# Patient Record
Sex: FEMALE | Race: BLACK OR AFRICAN AMERICAN | NOT HISPANIC OR LATINO | URBAN - METROPOLITAN AREA
[De-identification: names, ages, dates, MRNs, and addresses within clinical notes are randomized per-mention and may not be internally consistent; named-entity substitution may affect disease eponyms.]

---

## 2023-11-08 ENCOUNTER — DOCUMENTATION (OUTPATIENT)
Dept: TRANSPLANT | Facility: HOSPITAL | Age: 58
End: 2023-11-08
Payer: COMMERCIAL

## 2023-11-08 ENCOUNTER — TELEPHONE (OUTPATIENT)
Dept: TRANSPLANT | Facility: HOSPITAL | Age: 58
End: 2023-11-08

## 2023-11-08 VITALS — HEIGHT: 67 IN | WEIGHT: 290.13 LBS | BODY MASS INDEX: 45.54 KG/M2

## 2023-12-22 DIAGNOSIS — Z01.818 PRE-TRANSPLANT EVALUATION FOR KIDNEY TRANSPLANT: Primary | ICD-10-CM

## 2024-01-04 ENCOUNTER — TELEPHONE (OUTPATIENT)
Dept: TRANSPLANT | Facility: HOSPITAL | Age: 59
End: 2024-01-04
Payer: COMMERCIAL

## 2024-01-04 ENCOUNTER — DOCUMENTATION (OUTPATIENT)
Dept: TRANSPLANT | Facility: HOSPITAL | Age: 59
End: 2024-01-04
Payer: COMMERCIAL

## 2024-01-04 DIAGNOSIS — N18.6 ESRD (END STAGE RENAL DISEASE) (MULTI): Primary | ICD-10-CM

## 2024-01-04 NOTE — PROGRESS NOTES
Do you have difficulty reading or writing in English?   no   What is the primary cause of your kidney disease?  Diabetes  Are you currently on dialysis?   yes  If yes, what days do you have your dialysis treatments?  M,W,F  Have you received a transplant before?   no  If yes, what organ, and when and where was your transplant?     Have you been diagnosed with diabetes?    yes  Have you tested positive for hepatitis or HIV?   no  Have you ever been diagnosed with cancer?   no  If yes, what type of cancer, and when and where were you treated?     Do you have a history of a heart attack or stroke?  No  Are you currently or have you previously been seen by a mental health professional?   no  If yes, what is the name of your mental health provider?     Are you a current or former tobacco user?   no  Do you have history of alcohol abuse or dependence?   no  Do you have a history of illegal drug abuse or dependence?   no  Has anyone told you they're willing to donate their kidney to you?   no  Comments:  Intake complete, scheduled virtual [facility].

## 2024-01-18 ENCOUNTER — DOCUMENTATION (OUTPATIENT)
Dept: TRANSPLANT | Facility: HOSPITAL | Age: 59
End: 2024-01-18
Payer: MEDICARE

## 2024-01-18 NOTE — PROGRESS NOTES
Per EV [facility] Dual Plan & Medicaid active.  Opened case with [employer]  & faxed clinical to [phone] pending case# [id number].

## 2024-01-22 ENCOUNTER — TELEPHONE (OUTPATIENT)
Dept: TRANSPLANT | Facility: HOSPITAL | Age: 59
End: 2024-01-22
Payer: MEDICARE

## 2024-01-23 ENCOUNTER — APPOINTMENT (OUTPATIENT)
Dept: TRANSPLANT | Facility: HOSPITAL | Age: 59
End: 2024-01-23
Payer: COMMERCIAL

## 2024-02-12 NOTE — PROGRESS NOTES
Eval Clinic Note:  Patient attended evaluation appts on 02/13/2024 with Dr. Max and Dr. Mendieta.  Medications and allergies reviewed with patient.  Patient presented to appointment with female support.  Patient ambulated with a cane and used a wheelchair.  History:  Patient has a history of HALINA, toe amputations, and is being evaluated to undergo bariatric surgery  Dialysis: Dialysis MWF  Testing completed recently:  CXR, echo, CT A/P, mammogram, cardiology consult - all at [facility]  Testing needed for evaluation: CT cardiac score, dietician call, financial counselor call  Listing Consent: KDPI >85%, DCD, Hep C, Hep B  Comments:  Provided patient with my contact info for questions and concerns.      Per Dr. Mendieta pt will need a 6-MWT also.    LISTING EDUCATION    Patient educated regarding the following prior to placement on the transplant waiting list:   The patient's medical condition, prognosis, and treatment plan.   The expectations and patient responsibilities while on the waiting list, including:   Keeping the transplant center informed of any changes in contact information or insurance coverage   Notifying the transplant center of any changes in medical status   Required testing and/or re-evaluation appointments while awaiting transplant   An overview of the surgical procedure, including potential risks and alternatives.   Information regarding what to expect during the inpatient admission and recovery period.   A discussion regarding organ offers and types of potential donors, including potential risks that may be associated with specific types of donors that could affect the success of the transplant or the health of the patient.  The right to refuse transplantation.     Patient was given the opportunity to have questions answered. Patient was provided a copy of the informed consent for transplant listing.    Education provided by:  Transplant Coordinator: Ilda   Transplant Physician: Mane Mendieta,  MD    Signed listing informed consent received? YES  Patient agrees to be listed for the following:  KDPI > 85% [X]  Donors After Circulatory Death (DCD) [X]  Donors with a Positive Core Antibody for Hepatitis B []  Donors with Hepatitis C Virus to recipients with hepatitis C []  Donors with Hepatitis C Virus to Negative hepatitis C recipients [X]    Patient will be discussed at an upcoming selection committee to determine eligibility to be placed on the [facility] waiting list.

## 2024-02-12 NOTE — PROGRESS NOTES
Patient attended in-person consent signing on 02/13/2024.  Virtual education was completed prior to in-person consent signing.  Accompanied to class with female support.  Patient remained attentive throughout the review session, asking appropriate questions.  Evaluation consents were signed.     PRE-TRANSPLANT EDUCATION  Patient received education regarding the following topics as part of their pre-transplant evaluation:  The evaluation process, including:   Transplant team members and roles    Required consultations and testing   Selection criteria and suitability for transplant   Listing process and receiving an organ offer   Psychosocial and financial considerations for a successful transplant   Patient responsibilities, including the necessity of adhering to a strict medical regimen  An overview of the surgical procedure   Potential medical, surgical, and psychosocial risks to transplantation, including:   Wound infection   Pneumonia   Blood clot formation   Organ rejection, failure, and possibility of re-transplantation   Lifetime immunosuppression therapy and associated risks   Arrhythmias and cardiovascular collapse   Multi-organ system failure   Death   Depression   Post-Traumatic Stress Disorder   Generalized anxiety, issues of dependence, and feelings of guilt  Available alternatives to transplantation  Donor risk factors that could affect the success of the transplant and the health of the patient, including:   Donor age   Donor medical and social history   Condition of the organ   Risk of cash cancer, HIV, Hepatitis B, Hepatitis C, or malaria if the infection is not detectable at the time of donation  Patient's right to withdraw consent for transplantation at any time during the process  Transplants not performed in a Medicare-approved transplant center could affect the patient?s ability to have immunosuppression medication paid for under Medicare part B.   Multiple listing options.    Patient was  given the opportunity to have questions answered. Patient was provided a copy of the informed consent for transplant evaluation.    Signed evaluation informed consent received? 02/13/2024

## 2024-02-13 ENCOUNTER — OFFICE VISIT (OUTPATIENT)
Dept: TRANSPLANT | Facility: HOSPITAL | Age: 59
End: 2024-02-13
Payer: MEDICARE

## 2024-02-13 ENCOUNTER — LAB REQUISITION (OUTPATIENT)
Dept: LAB | Facility: CLINIC | Age: 59
End: 2024-02-13
Payer: MEDICARE

## 2024-02-13 ENCOUNTER — LAB (OUTPATIENT)
Dept: LAB | Facility: LAB | Age: 59
End: 2024-02-13
Payer: COMMERCIAL

## 2024-02-13 ENCOUNTER — EDUCATION (OUTPATIENT)
Dept: TRANSPLANT | Facility: HOSPITAL | Age: 59
End: 2024-02-13
Payer: MEDICARE

## 2024-02-13 ENCOUNTER — SOCIAL WORK (OUTPATIENT)
Dept: TRANSPLANT | Facility: HOSPITAL | Age: 59
End: 2024-02-13
Payer: MEDICARE

## 2024-02-13 VITALS
DIASTOLIC BLOOD PRESSURE: 78 MMHG | HEART RATE: 102 BPM | BODY MASS INDEX: 45.67 KG/M2 | WEIGHT: 291 LBS | SYSTOLIC BLOOD PRESSURE: 151 MMHG | TEMPERATURE: 97.3 F | OXYGEN SATURATION: 96 %

## 2024-02-13 DIAGNOSIS — Z01.818 PRE-TRANSPLANT EVALUATION FOR KIDNEY TRANSPLANT: Primary | ICD-10-CM

## 2024-02-13 DIAGNOSIS — E11.8 TYPE 2 DIABETES MELLITUS WITH UNSPECIFIED COMPLICATIONS (MULTI): ICD-10-CM

## 2024-02-13 DIAGNOSIS — N18.9 CHRONIC KIDNEY DISEASE, UNSPECIFIED CKD STAGE: ICD-10-CM

## 2024-02-13 DIAGNOSIS — Z51.81 ENCOUNTER FOR THERAPEUTIC DRUG LEVEL MONITORING: ICD-10-CM

## 2024-02-13 DIAGNOSIS — Z01.818 PRE-TRANSPLANT EVALUATION FOR KIDNEY TRANSPLANT: ICD-10-CM

## 2024-02-13 DIAGNOSIS — E11.22 TYPE 2 DIABETES MELLITUS WITH DIABETIC CHRONIC KIDNEY DISEASE, UNSPECIFIED CKD STAGE, UNSPECIFIED WHETHER LONG TERM INSULIN USE (MULTI): ICD-10-CM

## 2024-02-13 DIAGNOSIS — N18.6 END STAGE RENAL DISEASE (MULTI): ICD-10-CM

## 2024-02-13 LAB
ABO GROUP (TYPE) IN BLOOD: NORMAL
ALBUMIN SERPL BCP-MCNC: 4 G/DL (ref 3.4–5)
ALP SERPL-CCNC: 168 U/L (ref 33–110)
ALT SERPL W P-5'-P-CCNC: 4 U/L (ref 7–45)
AMYLASE SERPL-CCNC: 40 U/L (ref 29–103)
AST SERPL W P-5'-P-CCNC: 37 U/L (ref 9–39)
BILIRUB DIRECT SERPL-MCNC: 0.1 MG/DL (ref 0–0.3)
BILIRUB SERPL-MCNC: 0.4 MG/DL (ref 0–1.2)
BUN SERPL-MCNC: 31 MG/DL (ref 6–23)
C PEPTIDE SERPL-MCNC: 7.5 NG/ML (ref 0.7–3.9)
CHOLEST SERPL-MCNC: 165 MG/DL (ref 0–199)
CHOLESTEROL/HDL RATIO: 3.6
CMV IGG AVIDITY SERPL IA-RTO: REACTIVE %
CREAT SERPL-MCNC: 6.55 MG/DL (ref 0.5–1.05)
EBV EA IGG SER QL: NEGATIVE
EBV NA AB SER QL: POSITIVE
EBV VCA IGG SER IA-ACNC: POSITIVE
EBV VCA IGM SER IA-ACNC: ABNORMAL
EGFRCR SERPLBLD CKD-EPI 2021: 7 ML/MIN/1.73M*2
ERYTHROCYTE [DISTWIDTH] IN BLOOD BY AUTOMATED COUNT: 14.6 % (ref 11.5–14.5)
EST. AVERAGE GLUCOSE BLD GHB EST-MCNC: 174 MG/DL
FLOW AUTOCROSSMATCH: NORMAL
HBA1C MFR BLD: 7.7 %
HBV CORE AB SER QL: NONREACTIVE
HBV SURFACE AB SER-ACNC: 133.4 MIU/ML
HBV SURFACE AG SERPL QL IA: NONREACTIVE
HCT VFR BLD AUTO: 34.2 % (ref 36–46)
HCV AB SER QL: NONREACTIVE
HDLC SERPL-MCNC: 46.4 MG/DL
HGB BLD-MCNC: 11.2 G/DL (ref 12–16)
HIV 1+2 AB+HIV1 P24 AG SERPL QL IA: NONREACTIVE
HLA CLASS I AB SCREEN,FC: NORMAL
HLA CLASS II AB SCREEN,FC: NORMAL
HLA CLS I TYP PNL BLD/T DONR HIGH RES: NORMAL
HLA RESULTS: NORMAL
HLA-DP2 QL: NORMAL
HLA-DQB1 HIGH RES: NORMAL
HLA-DRB1 HIGH RES: NORMAL
INR PPP: 1 (ref 0.9–1.1)
MCH RBC QN AUTO: 30.6 PG (ref 26–34)
MCHC RBC AUTO-ENTMCNC: 32.7 G/DL (ref 32–36)
MCV RBC AUTO: 93 FL (ref 80–100)
NON-HDL CHOLESTEROL: 119 MG/DL (ref 0–149)
NRBC BLD-RTO: 0 /100 WBCS (ref 0–0)
PHOSPHATE SERPL-MCNC: 3.2 MG/DL (ref 2.5–4.9)
PLATELET # BLD AUTO: 277 X10*3/UL (ref 150–450)
PROT SERPL-MCNC: 7.7 G/DL (ref 6.4–8.2)
PROTHROMBIN TIME: 11 SECONDS (ref 9.8–12.8)
RBC # BLD AUTO: 3.66 X10*6/UL (ref 4–5.2)
RH FACTOR (ANTIGEN D): NORMAL
TREPONEMA PALLIDUM IGG+IGM AB [PRESENCE] IN SERUM OR PLASMA BY IMMUNOASSAY: NONREACTIVE
VARICELLA ZOSTER IGG INDEX: 6.9 IA
VZV IGG SER QL IA: POSITIVE
WBC # BLD AUTO: 9.7 X10*3/UL (ref 4.4–11.3)

## 2024-02-13 PROCEDURE — 99215 OFFICE O/P EST HI 40 MIN: CPT

## 2024-02-13 PROCEDURE — 80323 ALKALOIDS NOS: CPT

## 2024-02-13 PROCEDURE — 99204 OFFICE O/P NEW MOD 45 MIN: CPT | Performed by: STUDENT IN AN ORGANIZED HEALTH CARE EDUCATION/TRAINING PROGRAM

## 2024-02-13 PROCEDURE — 80076 HEPATIC FUNCTION PANEL: CPT

## 2024-02-13 PROCEDURE — 85027 COMPLETE CBC AUTOMATED: CPT

## 2024-02-13 PROCEDURE — 84100 ASSAY OF PHOSPHORUS: CPT

## 2024-02-13 PROCEDURE — 86664 EPSTEIN-BARR NUCLEAR ANTIGEN: CPT

## 2024-02-13 PROCEDURE — 86901 BLOOD TYPING SEROLOGIC RH(D): CPT

## 2024-02-13 PROCEDURE — 83036 HEMOGLOBIN GLYCOSYLATED A1C: CPT

## 2024-02-13 PROCEDURE — 87389 HIV-1 AG W/HIV-1&-2 AB AG IA: CPT

## 2024-02-13 PROCEDURE — 99214 OFFICE O/P EST MOD 30 MIN: CPT | Performed by: STUDENT IN AN ORGANIZED HEALTH CARE EDUCATION/TRAINING PROGRAM

## 2024-02-13 PROCEDURE — 86665 EPSTEIN-BARR CAPSID VCA: CPT

## 2024-02-13 PROCEDURE — 81382 HLA II TYPING 1 LOC HR: CPT | Mod: 59,OUT | Performed by: SURGERY

## 2024-02-13 PROCEDURE — 82150 ASSAY OF AMYLASE: CPT

## 2024-02-13 PROCEDURE — 83718 ASSAY OF LIPOPROTEIN: CPT

## 2024-02-13 PROCEDURE — 80307 DRUG TEST PRSMV CHEM ANLYZR: CPT

## 2024-02-13 PROCEDURE — 99205 OFFICE O/P NEW HI 60 MIN: CPT

## 2024-02-13 PROCEDURE — 86644 CMV ANTIBODY: CPT

## 2024-02-13 PROCEDURE — 86780 TREPONEMA PALLIDUM: CPT

## 2024-02-13 PROCEDURE — 85610 PROTHROMBIN TIME: CPT

## 2024-02-13 PROCEDURE — 86704 HEP B CORE ANTIBODY TOTAL: CPT

## 2024-02-13 PROCEDURE — 86663 EPSTEIN-BARR ANTIBODY: CPT

## 2024-02-13 PROCEDURE — 84681 ASSAY OF C-PEPTIDE: CPT

## 2024-02-13 PROCEDURE — 86481 TB AG RESPONSE T-CELL SUSP: CPT

## 2024-02-13 PROCEDURE — 86803 HEPATITIS C AB TEST: CPT

## 2024-02-13 PROCEDURE — 36415 COLL VENOUS BLD VENIPUNCTURE: CPT

## 2024-02-13 PROCEDURE — 84520 ASSAY OF UREA NITROGEN: CPT

## 2024-02-13 PROCEDURE — 86706 HEP B SURFACE ANTIBODY: CPT

## 2024-02-13 PROCEDURE — 82465 ASSAY BLD/SERUM CHOLESTEROL: CPT

## 2024-02-13 PROCEDURE — 86825 HLA X-MATH NON-CYTOTOXIC: CPT | Mod: OUT | Performed by: SURGERY

## 2024-02-13 PROCEDURE — 87340 HEPATITIS B SURFACE AG IA: CPT

## 2024-02-13 PROCEDURE — 86900 BLOOD TYPING SEROLOGIC ABO: CPT

## 2024-02-13 PROCEDURE — 82565 ASSAY OF CREATININE: CPT

## 2024-02-13 PROCEDURE — 86787 VARICELLA-ZOSTER ANTIBODY: CPT

## 2024-02-13 ASSESSMENT — PATIENT HEALTH QUESTIONNAIRE - PHQ9
7. TROUBLE CONCENTRATING ON THINGS, SUCH AS READING THE NEWSPAPER OR WATCHING TELEVISION: NOT AT ALL
2. FEELING DOWN, DEPRESSED OR HOPELESS: NOT AT ALL
SUM OF ALL RESPONSES TO PHQ9 QUESTIONS 1 & 2: 2
4. FEELING TIRED OR HAVING LITTLE ENERGY: NEARLY EVERY DAY
5. POOR APPETITE OR OVEREATING: NOT AT ALL
3. TROUBLE FALLING OR STAYING ASLEEP OR SLEEPING TOO MUCH: NOT AT ALL
6. FEELING BAD ABOUT YOURSELF - OR THAT YOU ARE A FAILURE OR HAVE LET YOURSELF OR YOUR FAMILY DOWN: NOT AT ALL
10. IF YOU CHECKED OFF ANY PROBLEMS, HOW DIFFICULT HAVE THESE PROBLEMS MADE IT FOR YOU TO DO YOUR WORK, TAKE CARE OF THINGS AT HOME, OR GET ALONG WITH OTHER PEOPLE: NOT DIFFICULT AT ALL
1. LITTLE INTEREST OR PLEASURE IN DOING THINGS: MORE THAN HALF THE DAYS
8. MOVING OR SPEAKING SO SLOWLY THAT OTHER PEOPLE COULD HAVE NOTICED. OR THE OPPOSITE, BEING SO FIGETY OR RESTLESS THAT YOU HAVE BEEN MOVING AROUND A LOT MORE THAN USUAL: NOT AT ALL
SUM OF ALL RESPONSES TO PHQ QUESTIONS 1-9: 5
9. THOUGHTS THAT YOU WOULD BE BETTER OFF DEAD, OR OF HURTING YOURSELF: NOT AT ALL

## 2024-02-13 ASSESSMENT — ANXIETY QUESTIONNAIRES
3. WORRYING TOO MUCH ABOUT DIFFERENT THINGS: NOT AT ALL
2. NOT BEING ABLE TO STOP OR CONTROL WORRYING: NOT AT ALL
GAD7 TOTAL SCORE: 0
6. BECOMING EASILY ANNOYED OR IRRITABLE: NOT AT ALL
7. FEELING AFRAID AS IF SOMETHING AWFUL MIGHT HAPPEN: NOT AT ALL
1. FEELING NERVOUS, ANXIOUS, OR ON EDGE: NOT AT ALL
5. BEING SO RESTLESS THAT IT IS HARD TO SIT STILL: NOT AT ALL
4. TROUBLE RELAXING: NOT AT ALL

## 2024-02-13 ASSESSMENT — PAIN SCALES - GENERAL: PAINLEVEL: 0-NO PAIN

## 2024-02-13 NOTE — PROGRESS NOTES
Transplant Nephrology Evaluation :    Trisha Jackson is a 58 y.o.  came for Kidney transplant Evaluation .    History in detail : Iza Jackson is a 58-year old female with a history of ESRD secondary to long-term diabetes currently on dialysis since August 2023 through chest wall catheter.  Patient having issues with fistula or graft placement because of small veins.  -Other history include toe amputations and transmetatarsal amputation, partial hysterectomy, congestive heart failure.  Obstructive sleep apnea uses CPAP, being evaluated for bariatric surgery at [facility], hypertension, hyperlipidemia.  -She is diabetic for almost 29 years having some retinopathy and neuropathy in both the fourth dose.  Had nonhealing ulcer in the right toe initially which worsened and gradually spread to other toes had transmetatarsal amputation of the right leg currently uses cane for balance.  -Never had any kidney biopsy, urinates 1-2 times a day half cup, having issues with access currently on catheter as well as low blood pressures on the machine but uses a 1 out of 2 blood pressure medications, dry weight is 128.    Psychiatric issues : Denied any psychiatric issues  Recent hospitalizations : Nothing significant  Recent Transfusions : No prior history of blood transfusions  Pain medication issues : No prior pain medication use  Hx of kidney stones : No history of kidney stones      Past Medical History : History of obstructive sleep apnea on CPAP, being evaluated for bariatric surgery for BMI 45.67, chronic diabetes with diabetic complications as well as a white transmetatarsal amputation    Surgical History: Right transmetatarsal amputation, fistula and graft several in the left upper extremity which are failed, partial hysterectomy for heavy bleeding    Family HX: Brother had a kidney transplant no significant history of cancer in the family.    Social Connections: Not on file      Lives with her son, no potential living  donors, brother and sister are the primary caregivers.  Denied smoking history drug use or alcohol use.      PROBLEM LIST:  Active Problems:  There are no active Hospital Problems.         ALLERGIES:  Not on File         CURRENT MEDICATIONS:  Scheduled medications    Continuous medications    PRN medications         OBJECTIVE:    VITALS: Visit Vitals  /78   Pulse 102   Temp 36.3 °C (97.3 °F) (Temporal)   Wt 132 kg (291 lb)   SpO2 96%   BMI 45.67 kg/m²   BSA 2.5 m²        General: No distress   Mucosa moist   AI, AC, AF     HEENT: PEERLA  CVS: S1 S2 no murmurs  RESP:  Lungs clear to auscultation   ABDO: Soft, non-tender   Neuro: A + O x 3  Skin: No rash   Extremities: No edema       LABS:  Results from last 72 hours   Lab Units 02/13/24  1059   WBC AUTO x10*3/uL 9.7   HEMOGLOBIN g/dL 11.2*   MCV fL 93   PLATELETS AUTO x10*3/uL 277   BUN mg/dL 31*   CREATININE mg/dL 6.55*          [unfilled]       ASSESSMENT AND PLAN:     Iza Jackson is a 58-year old female with a history of ESRD secondary to long-term diabetes currently on dialysis since August 2023 through chest wall catheter.  Patient having issues with fistula or graft placement because of small veins.  -Other history include toe amputations and transmetatarsal amputation, partial hysterectomy, congestive heart failure.  Obstructive sleep apnea uses CPAP, being evaluated for bariatric surgery at [facility], hypertension, hyperlipidemia.    Marginal candidate for kidney transplantation:  -Karnofsky score is around 70% because of use of cane and imbalance due to transmetatarsal amputation.  -Need cardiac workup with cardiac clearance patient is getting cleared at [facility] will get records from [facility] along with CT cardiac scoring and cleared by her cardiologist.  -Normal rest as well as dobutamine stress test, with normal EF as of 1/30/2024.  -Echocardiogram from 8/2/2023 showing EF of 65% hypertrophy is concentric normal right ventricular  systolic function mild pulmonary hypertension 40 to 50 mmHg, small pericardial effusion without evidence of tamponade need updated echocardiogram to evaluate for pulmonary hypertension.  -Pulmonology evaluation for obstructive sleep apnea.  -YOLY TBI for both lower extremities to evaluate for vascular lesions along with a CAT scan abdomen pelvis.  -General laboratory workup and drug screen.  -Will also get records from outside hospital regarding her GFR calculation because of so far we have from  her GFR is around 7 being an -American we need GFR update.  -Her BMI is 45.67 is planning to undergo bariatric surgery prior to the transplantation  -Will discuss in the committee about her candidacy for further    I had a discussion with this patient regarding 1 year graft and patient survival statistics following renal transplantation for both living and  donor allograft recipients. This data included [facility] data compared to National data readily available for review on https://www.SRTR.org. The patient also had attended the kidney transplant education class provided by the transplant institute.    Further discussion included:  -The transplant selection committee process.  -The need for lifelong immunosuppressive therapy, and the side effects of these medications including the risk of infections, cancer, and lymphoma.  -The wait list time approximately is 5 years or more for  donor transplants and the statistical superiority of a living donor.  -The patient was re-educated regarding the responsibilities of being listed on the transplant waitlist.  - Patient encouraged to avoid blood transfusion unless it's deemed a medically necessary.  -Educated patient on the current allocation policy per [facility] regarding kidney and/or kidney-pancreas/pancreas transplant.  - Education covered the need for monthly serum samples to be drawn and sent to the [facility] while actively listed.  -  The patient was told to inform the Pre Transplant office if there are any changes in their medical condition, demographics, insurance coverage ( including medication coverage) and or dialysis units.  - The patient was reminded to fax test results of studies that were obtained outside [facility] facility.  - Using identified donors with risk criteria for transmission of infection  -Potential transmission of infectious disease from any  donors, as well as living donors.   -The possibility of transmission of tumors and infections via the transplanted organ.  -The inability to completely test for all potential harmful tumors or infectious agents.  -The possibility of listing at multiple locations.    Surgical complications including need for reoperation(s) including but not limited to:  -Bleeding.  -Repair of leaks.  -Control of infection.  -Possible kidney transplant removal.    The medical complications including but not limited to:  -Death.  -Cardiac.  -Pulmonary.  -Infectious.  -Neurologic.  -Other Complications.    We also discussed how the kidney transplant could function:  -Non-function and possible kidney transplant removal within the first 3 to 6 months.  -Delayed graft function (dialysis needed after transplant).  -The potential of recurrence of kidney disease leading to kidney transplant graft loss.    Thank you for consulting :  Winter Rangel MD

## 2024-02-13 NOTE — PATIENT INSTRUCTIONS
Thank You for coming to  [facility].  You are currently in evaluation for kidney transplant.  In order to be eligible to be placed on the wait list you must complete certain tests and appointments.  The following tests/appointments will be scheduled for you:     -YOLY/TBI  - CT cardiac score  - Cardiology consultation   - Virtual finance appointment  - CT abdomen/pelvis    Please complete the following testing with your primary care doctor:    -Pap  -Colonoscopy      Please call [phone] with any questions or if you need assistance.    Ilda Jones  MSN, RN Transplant Coordinator

## 2024-02-13 NOTE — PROGRESS NOTES
"ENCOUNTER    Visit Type Initial Visit  Location: [place] [address]    Barriers to Communication / Understanding:   [] Language [] Vision [] Hearing [] Other      []  Present     Accompanied By: SisterJeannie     Organ For Transplant:  Kidney    Ethnicity:  Black Or     ADLs Fully Independent      Instrumental ADLs Fully Independent      Level of Activity Sedentary      DME: Cane, \"uses it mostly for long distances but depends\"     Knowledge of Health Good  Hypertension, high cholesterol, CHF     Why Do You Have End Stage Organ Disease   Diabetes    Knowledge of Transplant / VAD:  Yes Patient Is Able To Make An Informed Decision    Patient Understands the Risks of Transplant / VAD  Yes Rejection  Yes Infection Yes Complications  Yes Death    Patient Understands Recovery and Follow-Up from Transplant / VAD  Yes Length Of State Yes Appointments  Yes Labs  Yes Rehabilitation    Patient Has Identified Goals of Transplant / VAD Yes  Pt reported \"I want to feel better, I would like to go back to work, and get off of dialysis.\"     Any Potential Donors?     Overall Compliance  good     10 medications daily.   Compliance With Medications good    Managed By Patient Pill box     Understanding Of Medication  good  Compliance With Appointments good    How Does Patient Handle Health Problems      Organ  Kidney    IOP:     Fluid Restriction:   Yes [x] Compliant   1800 ml daily   Medical And Clinic Appointment Compliant Yes    Dialysis:  [x] What Dialysis Center [facility]  [x] Began Beginning of September 2023       [x] In Center [] Home Hemo [] Peritoneal       Attendance:  Treatment Attendance Good Treatment Time MWF and runs for 4.5 hours    [] Shortened Treatments [] Rescheduled Treatments [] Missed Treatments       Fluids:     Does Patient Still Urinate  [] Fluid Restriction [] IDWG [] EDW  Achieved Dry Weight        Diet:  Yes Patient is compliant with renal restrictions    Yes " Patient is compliant with low sodium diet      Labs:    [] Patient Reported [] Collateral       []  Potassium [] Albumin [] Phosphorus      # of Binders:  [x] # of Binders per meal 2 [x] Meals per day 1-2, depends on the day      []  # of Binders per Snack [] Snacks per day [] Phosphorus     Pancreas:  [] Checks blood sugar      times/day [] A1c   Hypoglycemic Episodes  Unawareness  Outside Interventions    Liver:  Is Lactulose prescribed  Dose:   Timesper day:  Is patient compliant       SOCIAL HISTORY  No       Education:  education: Some College ST[employer] certificate     Literate Yes   Computer literate Yes    Internet access Yes       Sources of Income: Pt reported she began receiving disability in 2008. Pt reported she receives $1,528.00/monthly. Pt shared she receives $160.00/monthly in food stamps.   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, did private duty     Will patient have paid status from employment during recovery       Spouse / SO Current Employment     Will spouse / SO have paid status from employment during recovery       Other Sources of Income       Does patient have financial concerns No      Is patient able to meet current monthly expenses Yes    Resources:      Patient was provided information on transplant fundraising       Insurance:  Primary Insurance Medicaid [employer]     Secondary Insurance     Prescription Coverage Copay cost per month Sometimes, low co-pays if so    Medicare coverage due to     Medicare Part A  Effective date    Medicare Part B  Effective date    Medicare Part C  Deductable  Out of Pocket Max    Medicare Part D  Extra Help?    Medicaid  Waiver  Redetermination Date    HMO       FAMILY SYSTEM    Single Yes    How long   Describe  Relationship    How long   Describe Relationship    When    When  In a Relationship   How Long  Describe Relationship    Spouse / SO Name   Age   Health   Other Caregiver Responsibilities  Spouse / Significant others reaction to donation    Children:  Yes # Biological (1 son)   # Adopted    # Step Children        Child #1 Name  Luigi   Age 29  Health  Pretty good     Lives With patient  How Much Contact Daily    Parents:  Raised By One Biological Parent Mom     Did the patient have contact with the other parent Yes    Mother  No Age   Cause of Death   Father  Yes Age 83  Cause of Death  Diabetic complications    Living Parent #1 Dinorah Jackson, [employer] [facility], contact daily     Additional Information    Siblings:  [x] # Biological (1 brother, 3 sisters)  [] # Half Siblings [] # Step Siblings     Sibling #1 [facility], contact daily     Support & Recovery Plan:  Yes Adequate    Primary Support:  Name Jeannie  Phone [phone]  Age 61  Relationship to Patient Sister   If employed, can they take time off work Retired   If so, is it paid time off    If not, will this impact your finances No   Did they attend education classes Yes   Do they have other caregiver responsibilities (child or eldercare) Yes Mom- partially independent on her own. Jeannie's daughter could watch their mom if needed   Do they have their own conditions which may prevent them from providing care for you No  (Medical, psychological, physical limitations)    Are they available on short notice Yes   Are they reliable Yes   Are they responsible Yes   Are they able to understand and process new information Yes   Do they have reliable transportation or will you allow them to use your vehicle Yes   Are they currently involved in your care Yes   Comments    Secondary Support:  Name Eddie     Phone [phone] Age 64  Relationship to Patient Brother   If employed, can they take time off work  Retired  "  If so, is it paid time off    If not, will this impact your finances No   Did they attend education classes No   Do they have other caregiver responsibilities (child or eldercare) No   Do they have their own conditions which may prevent them from providing care for you No  (Medical, psychological, physical limitations)    Are they available on short notice Yes   Are they reliable Yes   Are they responsible Yes   Are they able to understand and process new information Yes   Do they have reliable transportation or will you allow them to use your vehicle Yes   Are they currently involved in your care Yes   Comments    Alternate Support  Alternate Support    Housing:  Yes Adequate Rents home  Type of Home Apartment  Distance to [facility] 25 minutes   Pets 0  Does Patient Feel Safe in Home Yes      Transportation:  Yes Adequate  # Licensed Drivers in the Home 2  Does Patient Drive Yes If not, why  # Reliable Vehicles 1  Does Patient use Public Transportation No  Does Patient use Medical Transportation No  Comments    MENTAL HEALTH  Cognition:  No impairment observed / reported    The patient reports their mood as \"Okay.\"      Reported Mental Health Diagnosis Denied  Family History of Mental Health Concerns Denied  What are patient psychosocial stressors Denied       Current Medications:  No  Mental Health Meds  Denied  Rx'd by   Sleep Meds Melatonin  Rx'd by PCP  Pain Meds Denied  Rx'd by     OTC Meds Tylenol  Past Medications Denied    Counseling Never  Denied. Declined resources.     Has patient ever been hospitalized for mental health reasons No   Was the hospitalization voluntary  Duration   Where    When  Describe situation    Discharge Plan for Follow Up  Was Discharge Plan Completed   Referral to Transplant Psych No  Mental Health Follow Up Required      Suicide Assessment:  History of Suicide Ideation No   [] Timeframe [] Frequency   Frequency   Plan Created  Intent to Follow Through  Outcome      History of " Suicide Attempt No     History of Suicidal Ideation in the past 3 months No Intensity   Duration     Description of Plan      Plans thought of  Intent to Follow Through  Highest Level of Intent to Follow Through    Current Plan for Safety    Plan for Follow-Up    Patient's Reported Trauma History: Denied     What are patient's coping behaviors Pt reported she enjoys coloring posters, crocheting, and rug knitting.     Shinto / Spirituality Adventist     Attitude toward interviewer Cooperative and Appropriate    Eye Contact Patient maintained good eye contact throughout appointment    Appearance The patient was neatly groomed, appropriately dressed and adequately nourished    Affect Appropriate    Thought Process Appropriate    Substance Use /Abuse History:    Current Tobacco User No  Patient uses   Tobacco Frequency   For How Long  Is Patient Required to Quit     Former Tobacco User No  Describe past tobacco use and date quit    Current Alcohol User No  Type of Alcohol Used   Amount  Frequency   Pattern of Alcohol Use    Is Patient Required to Quit   Continued to use the substance despite being told the substance is affecting their health    History of problems at work, school or home due to substance use      Former Alcohol User Yes  Describe past alcohol use and date quit  Pt reported she last drank a year ago. Pt reported she would drink wine in the past. Pt reported she would take a few sips of wine in a sitting, drinking every 3 months. Pt denied her use ever being heavier. Pt denied any DUI's.     Has patient ever gone to CD treatment   If yes, When, Where and What type of Program  Attends AA meetings    Sponsor  Do support people drink alcohol   If yes, describe support people's use  Is alcohol kept in the home   Does Patient need to sign a CD contract     Current Illegal / Unprescribed Drug User No  Type of Illegal Drug Used   Frequency  Pattern of Drug Use    Is Patient Required to Quit     Illegal /  "Unprescribed Drug #2  Type of Illegal Drug Used   Frequency  Pattern of Drug Use      Continue to use the substance despite being told the substance is affecting their health    History of problems at work, school or home due to substance use      Former Illegal / Unprescribed Drug User No  Describe past illegal drug use and date quit    Has patient ever gone to CD treatment   If yes, When, Where and What type of Program   Attends AA/NA  meetings    Is patient on a Methadone / Suboxone regiment   Do support people use illegal drugs   If yes, describe support people's use  Are illegal drugs kept in the home   Does Patient need to sign a CD contract     Illegal / Unprescribed Drug #2  Type of Illegal Drug Used   Frequency  Pattern of Drug Use    Prescription Drug Abuse:  No Has patient experienced feelings of addiction  No Has patient experienced symptoms of withdrawal  No Has patient experienced any side effects? e.g.  hallucinations or delusions    Does Patient Meet the Criteria for Alcohol Use Disorder No Diagnosis  Does Patient Meet [facility]OT[facility] guidelines Yes  Does Patient Meet the Criteria for Illegal Drug Use Disorder No Diagnosis  Does Patient Meet [facility]OTC guidelines Yes    [facility] Substance Relapse Risk Factors   DSM-5 Severity Factors:     IOP     LEGAL ISSUES  No   Arrests  Currently probation or parole    long term   When   How long   Where       Citizenship:  Yes [place] Citizen   Green Card  Visa    Advance Directives: No  HCPOA     SAMINA provided documents.   Guardian:    GONZALO HAAS met with Pt and Pt's sister, Jeannie for initial psychosocial assessment. Pt was pleasant and engaged. Pt reported her insurance as [employer]. Pt demonstrated an excellent understanding of the transplant process. Pt denied any current financial concerns. Pt reported she receives disability and SNAP. Pt shared the cause of her kidney disease is diabetes. Pt shared her goals of transplant include \"I want to feel better, I " "would like to go back to work, and get off of dialysis.\" Pt reported good compliance with her medications, medical appointments, and dialysis. Pt listed her sister, Jeannie as primary support and her brother, Eddie as secondary support. SW to call and confirm secondary support. Pt reported her mood as \"okay.\" Pt denied any current/past MH diagnosis/concerns. Pt denied any current/past MH treatment. Pt scored a 5 on the PHQ-9, indicating mild clinical depression. Pt scored a 0 on the SULEMA-7, indicating no clinical anxiety. Pt denied any current/past tobacco and illicit drug use. Pt shared she last drank alcohol a year ago. Pt reported in the past she would have \"a couple sips of wine\" in a sitting once every 3 months. Pt denied any heavier alcohol use in the past. Pt scored a 4 on the SIPAT, indicating Pt is an excellent candidate for transplant. SW would recommend listing Pt for transplant once Pt's secondary support is confirmed.      PLAN  SW to call and confirm Pt's secondary support. SW will follow-up with Pt annually.   "

## 2024-02-14 LAB
FLOW AUTOCROSSMATCH: NORMAL
HLA RESULTS: NORMAL

## 2024-02-15 LAB
AMPHETAMINES SERPL QL SCN: NEGATIVE NG/ML
ANNOTATION COMMENT IMP: NORMAL
BARBITURATES SERPL QL SCN: NEGATIVE NG/ML
BENZODIAZ SERPL QL SCN: NEGATIVE NG/ML
BUPRENORPHINE SERPL-MCNC: NEGATIVE NG/ML
CANNABINOIDS SERPL QL SCN: NEGATIVE NG/ML
COCAINE SERPL QL SCN: NEGATIVE NG/ML
EBV VCA IGM SER-ACNC: <10 U/ML (ref 0–43.9)
METHADONE SERPL QL SCN: NEGATIVE NG/ML
METHAMPHET SERPL QL: NEGATIVE NG/ML
NIL(NEG) CONTROL SPOT COUNT: NORMAL
OPIATES SERPL QL SCN: NEGATIVE NG/ML
OXYCODONE SERPL QL: NEGATIVE NG/ML
PANEL A SPOT COUNT: 0
PANEL B SPOT COUNT: 0
PCP SERPL QL SCN: NEGATIVE NG/ML
POS CONTROL SPOT COUNT: NORMAL
T-SPOT. TB INTERPRETATION: NEGATIVE

## 2024-02-16 ENCOUNTER — DOCUMENTATION (OUTPATIENT)
Dept: TRANSPLANT | Facility: HOSPITAL | Age: 59
End: 2024-02-16
Payer: MEDICARE

## 2024-02-16 LAB
COTININE SERPL-MCNC: <5 NG/ML
NICOTINE SERPL-MCNC: <5 NG/ML

## 2024-02-16 NOTE — PROGRESS NOTES
SW reached out to Pt's secondary support, Eddie via telephone call to verify commitment. SW confirmed Pt's support's identity. Pt's support reported they are retired and live local to Pt. Pt's support reported that they do not have other caregiver responsibilities and they drive and have a working vehicle. Pt's support shared they are willing to be listed as support and reported they will care for Pt throughout the transplant process. Pt's support denied any further questions/concerns at this time.     Plan: SW to follow-up with Pt annually.

## 2024-02-22 LAB
HLA CLASS I AB SCREEN,FC: NORMAL
HLA CLASS II AB SCREEN,FC: NORMAL
HLA RESULTS: NORMAL

## 2024-02-24 RX ORDER — TORSEMIDE 100 MG/1
100 TABLET ORAL DAILY
COMMUNITY
End: 2024-04-15 | Stop reason: HOSPADM

## 2024-02-24 RX ORDER — ISOSORBIDE MONONITRATE 30 MG/1
30 TABLET, EXTENDED RELEASE ORAL DAILY
COMMUNITY

## 2024-02-24 RX ORDER — AMLODIPINE BESYLATE 5 MG/1
5 TABLET ORAL DAILY
COMMUNITY

## 2024-02-24 RX ORDER — SUCROFERRIC OXYHYDROXIDE 500 MG/1
1500 TABLET, CHEWABLE ORAL
COMMUNITY
End: 2024-04-15 | Stop reason: HOSPADM

## 2024-02-24 RX ORDER — HYDROXYZINE HYDROCHLORIDE 10 MG/1
10 TABLET, FILM COATED ORAL DAILY
COMMUNITY

## 2024-02-24 RX ORDER — NYSTATIN 100000 [USP'U]/G
1 POWDER TOPICAL 3 TIMES DAILY
COMMUNITY

## 2024-02-24 RX ORDER — EVOLOCUMAB 140 MG/ML
140 INJECTION, SOLUTION SUBCUTANEOUS
COMMUNITY
End: 2024-04-11 | Stop reason: ENTERED-IN-ERROR

## 2024-02-24 RX ORDER — INSULIN LISPRO 100 [IU]/ML
INJECTION, SOLUTION INTRAVENOUS; SUBCUTANEOUS
COMMUNITY

## 2024-02-24 RX ORDER — LIRAGLUTIDE 6 MG/ML
1.8 INJECTION SUBCUTANEOUS DAILY
COMMUNITY
End: 2024-04-15 | Stop reason: HOSPADM

## 2024-02-24 RX ORDER — INSULIN GLARGINE 100 [IU]/ML
15 INJECTION, SOLUTION SUBCUTANEOUS NIGHTLY
COMMUNITY

## 2024-02-24 RX ORDER — METHOCARBAMOL 500 MG/1
500 TABLET, FILM COATED ORAL 2 TIMES DAILY
COMMUNITY
End: 2024-04-15 | Stop reason: HOSPADM

## 2024-02-24 RX ORDER — OMEPRAZOLE 40 MG/1
40 CAPSULE, DELAYED RELEASE ORAL
COMMUNITY

## 2024-02-27 DIAGNOSIS — Z01.818 PRE-TRANSPLANT EVALUATION FOR KIDNEY TRANSPLANT: ICD-10-CM

## 2024-03-01 LAB
HLA CLS I TYP PNL BLD/T DONR HIGH RES: NORMAL
HLA RESULTS: NORMAL
HLA-DP2 QL: NORMAL
HLA-DQB1 HIGH RES: NORMAL
HLA-DRB1 HIGH RES: NORMAL

## 2024-03-04 ENCOUNTER — TELEPHONE (OUTPATIENT)
Dept: TRANSPLANT | Facility: HOSPITAL | Age: 59
End: 2024-03-04
Payer: MEDICARE

## 2024-03-05 ENCOUNTER — TELEPHONE (OUTPATIENT)
Dept: TRANSPLANT | Facility: HOSPITAL | Age: 59
End: 2024-03-05
Payer: MEDICARE

## 2024-03-06 ENCOUNTER — OTHER (OUTPATIENT)
Dept: TRANSPLANT | Facility: HOSPITAL | Age: 59
End: 2024-03-06
Payer: MEDICARE

## 2024-03-06 ENCOUNTER — DOCUMENTATION (OUTPATIENT)
Dept: TRANSPLANT | Facility: HOSPITAL | Age: 59
End: 2024-03-06
Payer: MEDICARE

## 2024-03-06 NOTE — PROGRESS NOTES
Per [facility] w/s case eff date 01/18/24 & c/m is Natividad Chavarria .  Precert needed w/[employer]  at time of txp.

## 2024-03-07 ENCOUNTER — DOCUMENTATION (OUTPATIENT)
Dept: TRANSPLANT | Facility: HOSPITAL | Age: 59
End: 2024-03-07
Payer: MEDICARE

## 2024-03-07 ENCOUNTER — NUTRITION (OUTPATIENT)
Dept: TRANSPLANT | Facility: HOSPITAL | Age: 59
End: 2024-03-07
Payer: COMMERCIAL

## 2024-03-07 NOTE — PROGRESS NOTES
Reason for Nutrition Visit:  Pt is a 58 y.o. female referred for MNT. Pt is being evaluated for kidney transplant.     Past Medical Hx:  There is no problem list on file for this patient.       Food and Nutrition Hx:  Pt reports appetite fluctuates. She reports that she typically eats 2 meals a day. She reports that she mets with the dietitian at dialysis to go over labs once a month and was told her potassium and phosphorus are within range, but her protein is low. She reported that she got cleared for bariatric surgery to get her BMI within range for possible kidney transplantation surgery.     24 Diet Recall:  Meal 1: when she goes to dialysis does not eat, 9-10 egg (2) with toast(1 slice of toast)  Meal 2: sometimes eats lunch, 1pm salad with grilled chicken(lettuce, red onion, bell peppers, celery), fat free italian dressing  Meal 3: 5pm, broccoli, chicken, fish, salad, stays away from beef  Snacks: grapes  Beverages: sugar free juice, water, on a fluid restriction 32oz     WEIGHT HISTORY  CW: 291# (132 kg)  BMI: 45.67 kg/m  Dry Weight: 128kg   Weight change:  No  Significant Weight Change: No    Lab Results   Component Value Date    HGBA1C 7.4 (H) 02/20/2024    HGBA1C 7.7 (H) 02/13/2024    CHOL 165 02/13/2024    BUN 31 (H) 02/13/2024    PHOS 3.2 02/13/2024          Food Preparation: Children  Cooking Skills/Barriers: None reported  Grocery Shopping: Children        Allergies: None  Intolerance: None  Appetite: Fluctuates  Intake: 50-75%  GI Symptoms : None   Swallowing Difficulty: No problems with swallowing  Dentition : own    Eating Out Type: Fast Food, Restaurant, and Take Out  1 times per week  Convenience Foods: Denies     Types of Activities: Mostly Sedentary    Sleep duration/quality : 7+ hours    Supplements: Denies       Nutrition Focused Physical Exam:    Performed/Deferred: Deferred due to be being virtual visit    Malnutrition Present: No    Estimated Energy Needs:    Weight Loss Needs: 15-17  kcal/kg/day and 1.2-1.3 g/pro/kg/day    Estimated Needs: 158-171g protein, 32 oz  fluid, and 5889-8991 kcal for weight loss    Nutrition Diagnosis:    Diagnosis Statement 1:  Diagnosis Status: New  Diagnosis : Food and nutrition related knowledge deficit related to lack of or limited prior nutrition-related education as evidenced by  increased protein needs, BMI 45.67        Nutrition Interventions:  Explained to the patient nutrition priorities leading up to possibly of being transplanted:  Weight management-informed him that his BMI is too high- work on weight loss, pt reports getting bariatric surgery before transplant to help with weight loss.   Dicussed plate method: fill 1/2 plate with non starchy vegetables(broccoli, carrots, cauliflower, salad greens, cucumbers, tomatoes); Fill 1/4 plate with lean protein (boneless chicken, skinless chicken breast or turkey, fish, egg, tofu); Fill 1/4 plate with carbohydrates/starches (grains, breads, fruits, starchy vegetables, beans, yogurt, milk). Use a 9 inch plate.  Eat more protein. Protein keeps your muscles strong and helps you prevent and fight infections. For best health, and to help replace what is lost during dialysis treatments, eat a high-protein diet every day.   Do not go long periods of time without eating. Try to eat at least 3 balanced meals with snacks in between. It's okay to listen to your body when it's hungry and eat healthy snacks in between meals.     Nutrition Goals:  Nutrition Goals: Carbohydrate consistency  Consistent meal/snack pattern  Weight Loss  Meat/Protein Foods: Increase  Eat 3 meals consistently  Eat breakfast consistently    Nutrition Recommendations:  1) None at this time.     Educational Handouts: High Protein Snack Ideas, High Protein Meal Ideas, [facility] Weight Loss & Metabolism, and All About Protein

## 2024-03-07 NOTE — PROGRESS NOTES
Spoke to patient today via the phone re her  Dual Plan & Medicaid benefits.  Discussed ESRD G/L.  She is aware her Medicare part B, not D, will cover her immuno medications.  She is aware she has benefits for a LD.  Discussed importance of maintaining her Medicaid benefits.

## 2024-03-19 ENCOUNTER — TELEPHONE (OUTPATIENT)
Dept: TRANSPLANT | Facility: HOSPITAL | Age: 59
End: 2024-03-19
Payer: MEDICARE

## 2024-03-19 DIAGNOSIS — Z01.818 PRE-TRANSPLANT EVALUATION FOR KIDNEY TRANSPLANT: ICD-10-CM

## 2024-03-19 NOTE — TELEPHONE ENCOUNTER
Talked to pt today.  Reviewed needed testing for eval.  She stated she is unable to do the 6-min walk test because of her CHF.    For now I will task out the remaining eval testing.  She is going to work on scheduling her mamm and colonoscopy; pap is next week.    I entered the needed testing and tasked out her appointments to NIKOLE Onofre.

## 2024-03-29 ENCOUNTER — APPOINTMENT (OUTPATIENT)
Dept: RADIOLOGY | Facility: HOSPITAL | Age: 59
End: 2024-03-29
Payer: COMMERCIAL

## 2024-03-29 ENCOUNTER — HOSPITAL ENCOUNTER (EMERGENCY)
Facility: HOSPITAL | Age: 59
Discharge: HOME | End: 2024-03-29
Attending: EMERGENCY MEDICINE
Payer: COMMERCIAL

## 2024-03-29 VITALS
OXYGEN SATURATION: 97 % | SYSTOLIC BLOOD PRESSURE: 147 MMHG | WEIGHT: 284 LBS | HEART RATE: 87 BPM | DIASTOLIC BLOOD PRESSURE: 70 MMHG | BODY MASS INDEX: 44.57 KG/M2 | HEIGHT: 67 IN | RESPIRATION RATE: 16 BRPM | TEMPERATURE: 98.3 F
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
## 2024-03-29 DIAGNOSIS — S09.90XA INJURY OF HEAD, INITIAL ENCOUNTER: ICD-10-CM

## 2024-03-29 DIAGNOSIS — M54.31 BILATERAL SCIATICA: Primary | ICD-10-CM

## 2024-03-29 DIAGNOSIS — M54.32 BILATERAL SCIATICA: Primary | ICD-10-CM

## 2024-03-29 DIAGNOSIS — M54.9 ACUTE BILATERAL BACK PAIN, UNSPECIFIED BACK LOCATION: ICD-10-CM

## 2024-03-29 PROCEDURE — 2500000002 HC RX 250 W HCPCS SELF ADMINISTERED DRUGS (ALT 637 FOR MEDICARE OP, ALT 636 FOR OP/ED): Performed by: EMERGENCY MEDICINE

## 2024-03-29 PROCEDURE — 99285 EMERGENCY DEPT VISIT HI MDM: CPT | Mod: 25

## 2024-03-29 PROCEDURE — 74176 CT ABD & PELVIS W/O CONTRAST: CPT

## 2024-03-29 PROCEDURE — 70450 CT HEAD/BRAIN W/O DYE: CPT | Performed by: RADIOLOGY

## 2024-03-29 PROCEDURE — 70450 CT HEAD/BRAIN W/O DYE: CPT

## 2024-03-29 PROCEDURE — 2500000001 HC RX 250 WO HCPCS SELF ADMINISTERED DRUGS (ALT 637 FOR MEDICARE OP): Performed by: EMERGENCY MEDICINE

## 2024-03-29 PROCEDURE — 74176 CT ABD & PELVIS W/O CONTRAST: CPT | Mod: FOREIGN READ | Performed by: RADIOLOGY

## 2024-03-29 PROCEDURE — 72125 CT NECK SPINE W/O DYE: CPT | Performed by: RADIOLOGY

## 2024-03-29 PROCEDURE — 71250 CT THORAX DX C-: CPT | Mod: FOREIGN READ | Performed by: RADIOLOGY

## 2024-03-29 PROCEDURE — 72125 CT NECK SPINE W/O DYE: CPT

## 2024-03-29 RX ORDER — ORPHENADRINE CITRATE 100 MG/1
100 TABLET, EXTENDED RELEASE ORAL 2 TIMES DAILY PRN
Status: DISCONTINUED | OUTPATIENT
Start: 2024-03-29 | End: 2024-03-29 | Stop reason: HOSPADM

## 2024-03-29 RX ORDER — GABAPENTIN 600 MG/1
600 TABLET ORAL ONCE
Status: COMPLETED | OUTPATIENT
Start: 2024-03-29 | End: 2024-03-29

## 2024-03-29 RX ORDER — ACETAMINOPHEN 325 MG/1
650 TABLET ORAL ONCE
Status: COMPLETED | OUTPATIENT
Start: 2024-03-29 | End: 2024-03-29

## 2024-03-29 RX ORDER — TIZANIDINE 4 MG/1
4 TABLET ORAL EVERY 6 HOURS PRN
Qty: 30 TABLET | Refills: 0 | Status: SHIPPED | OUTPATIENT
Start: 2024-03-29 | End: 2024-04-15 | Stop reason: HOSPADM

## 2024-03-29 RX ORDER — NAPROXEN 250 MG/1
500 TABLET ORAL ONCE
Status: COMPLETED | OUTPATIENT
Start: 2024-03-29 | End: 2024-03-29

## 2024-03-29 RX ORDER — GABAPENTIN 300 MG/1
300 CAPSULE ORAL 3 TIMES DAILY
Qty: 9 CAPSULE | Refills: 0 | Status: SHIPPED | OUTPATIENT
Start: 2024-03-29 | End: 2024-04-15 | Stop reason: HOSPADM

## 2024-03-29 RX ORDER — LIDOCAINE 50 MG/G
1 PATCH TOPICAL DAILY
Qty: 14 PATCH | Refills: 0 | Status: SHIPPED | OUTPATIENT
Start: 2024-03-29 | End: 2024-04-15 | Stop reason: HOSPADM

## 2024-03-29 RX ADMIN — GABAPENTIN 600 MG: 600 TABLET, FILM COATED ORAL at 08:25

## 2024-03-29 RX ADMIN — NAPROXEN 500 MG: 250 TABLET ORAL at 04:53

## 2024-03-29 RX ADMIN — ACETAMINOPHEN 650 MG: 325 TABLET ORAL at 04:53

## 2024-03-29 RX ADMIN — ORPHENADRINE CITRATE 100 MG: 100 TABLET, EXTENDED RELEASE ORAL at 04:53

## 2024-03-29 ASSESSMENT — PAIN - FUNCTIONAL ASSESSMENT: PAIN_FUNCTIONAL_ASSESSMENT: 0-10

## 2024-03-29 ASSESSMENT — PAIN DESCRIPTION - PAIN TYPE: TYPE: ACUTE PAIN

## 2024-03-29 ASSESSMENT — LIFESTYLE VARIABLES
TOTAL SCORE: 0
EVER HAD A DRINK FIRST THING IN THE MORNING TO STEADY YOUR NERVES TO GET RID OF A HANGOVER: NO
HAVE YOU EVER FELT YOU SHOULD CUT DOWN ON YOUR DRINKING: NO
HAVE PEOPLE ANNOYED YOU BY CRITICIZING YOUR DRINKING: NO
EVER FELT BAD OR GUILTY ABOUT YOUR DRINKING: NO

## 2024-03-29 ASSESSMENT — PAIN SCALES - GENERAL
PAINLEVEL_OUTOF10: 9
PAINLEVEL_OUTOF10: 9

## 2024-03-29 ASSESSMENT — PAIN DESCRIPTION - LOCATION
LOCATION: BACK
LOCATION_2: HEAD

## 2024-03-29 NOTE — ED PROVIDER NOTES
HPI   Chief Complaint   Patient presents with    Fall     Railing she was holding broke and patient fell backward, c/o pain back and back of head.        59 yo f with hx of obesity, ESRD, htn ,and DLD comes to the ED with ml of back pain. Pt fell yesterday hitting the back of her head on the floor without LOC. She was not very symptomatic at that time, but over the last few hours has developed lumbar and thoracic pain . She states that pain occasionally radiates down to b/l buttocks. No paresthesias      History provided by:  Patient   used: No                        No data recorded                     Patient History   Past Medical History:   Diagnosis Date    Personal history of other diseases of the nervous system and sense organs 2014    History of myopia     Past Surgical History:   Procedure Laterality Date     SECTION, CLASSIC  2013     Section    HYSTERECTOMY  2013    Hysterectomy    TOTAL KNEE ARTHROPLASTY  2015    Knee Replacement    TOTAL VAGINAL HYSTERECTOMY  2013    Vaginal Hysterectomy     No family history on file.  Social History     Tobacco Use    Smoking status: Not on file    Smokeless tobacco: Not on file   Substance Use Topics    Alcohol use: Not on file    Drug use: Not on file       Physical Exam   ED Triage Vitals   Temperature Heart Rate Respirations BP   24 0315 24 03124 03124   36.8 °C (98.3 °F) 85 18 155/71      Pulse Ox Temp Source Heart Rate Source Patient Position   24 03124 03124 --   95 % Oral Monitor       BP Location FiO2 (%)     -- --             Physical Exam  Vitals and nursing note reviewed.   Constitutional:       General: She is not in acute distress.     Appearance: She is well-developed.   HENT:      Head: Normocephalic and atraumatic.   Eyes:      Conjunctiva/sclera: Conjunctivae normal.   Cardiovascular:      Rate and Rhythm: Normal rate and  regular rhythm.      Heart sounds: No murmur heard.  Pulmonary:      Effort: Pulmonary effort is normal. No respiratory distress.      Breath sounds: Normal breath sounds.   Abdominal:      Palpations: Abdomen is soft.      Tenderness: There is no abdominal tenderness.   Musculoskeletal:         General: No swelling.      Cervical back: Neck supple.      Comments: Diffuse upper and lower back pain. No obvious deformity. No midline tenderness   Skin:     General: Skin is warm and dry.      Capillary Refill: Capillary refill takes less than 2 seconds.   Neurological:      Mental Status: She is alert.   Psychiatric:         Mood and Affect: Mood normal.         ED Course & MDM   Diagnoses as of 04/01/24 0654   Bilateral sciatica   Acute bilateral back pain, unspecified back location   Fall, initial encounter   Injury of head, initial encounter       Medical Decision Making    HPI:  As Above  PMHx/PSHx/Meds/Allergies/SH/FH as per nursing documentation and reviewed.  Review of systems: Total of 10 systems reviewed and otherwise negative except as noted elsewhere    DDX: As described in MDM    If performed, radiology listed above interpreted by me and confirmed by the Radiologist.  Medications administered during this visit (name and route): see MAR  Social determinants of health considered for this visit: lives at home  If performed, EKG interpreted by me and detailed above    MDM Summary/considerations:  59 yo f with mechanical fall and now developing a headache and diffuse back pain. No fx or d/l. Back pain likely sprain/strain from whiplash injury. Minimal component of sciatica. Will treat with muscle relaxers and short course of gabapentin. F/up with PCP and Ortho spine in 2-3 days    Prescriptions provided include:oral zanaflex, gabapentin, lidocaine patch    The patient was seen and triaged by our nursing/medic staff, their vitals were taken and the staff notes were reviewed.  If the patient arrived by an EMS squad  or an outside agency, we discussed the case with transporting EMS medic, police, or other historians. My initial assessment was attention to their airway, breathing, and circulatory status.  We addressed any immediate or life threatening findings and completed a medical history and a physical exam if the patient or those legally responsible were in agreement with this.   Prior to the patient being discharged, I or my PA/NP or the nursing staff discussed the differential, results and discharge plan with the patient and/or family/friend/caregiver if present.  I emphasized the importance of follow-up in 2-3 days unless otherwise specified.  I explained reasons for the patient to return to the Emergency Department. Additional verbal discharge instructions were also given and discussed with the patient to supplement those generated by the EMR. We also discussed medications that were prescribed (if any) including common side effects and interactions. The patient was advised to abstain from driving, operating heavy machinery or making significant decisions while taking medications such as antihistamines, benzodiazepines, opiates and muscle relaxers. All questions were addressed.  They understand return precautions and discharge instructions. The patient and/or family/friend/caregiver expressed understanding.  **Disclaimer:  This note was dictated by speech recognition technology.  Minor errors in transcription may be present.  Please contact for clarification or corrections.    In the case the patient eloped or refused treatment/admission, we offered to the best of our ability to provide care to the patient at the time of this encounter.    Amount and/or Complexity of Data Reviewed  Radiology: ordered and independent interpretation performed. Decision-making details documented in ED Course.        Procedure  Procedures     Kerry Muir MD  04/01/24 9428

## 2024-04-04 ENCOUNTER — TELEPHONE (OUTPATIENT)
Dept: TRANSPLANT | Facility: HOSPITAL | Age: 59
End: 2024-04-04
Payer: MEDICARE

## 2024-04-09 ENCOUNTER — APPOINTMENT (OUTPATIENT)
Dept: RADIOLOGY | Facility: HOSPITAL | Age: 59
End: 2024-04-09

## 2024-04-11 ENCOUNTER — APPOINTMENT (OUTPATIENT)
Dept: RADIOLOGY | Facility: HOSPITAL | Age: 59
DRG: 056 | End: 2024-04-11
Payer: COMMERCIAL

## 2024-04-11 ENCOUNTER — APPOINTMENT (OUTPATIENT)
Dept: CARDIOLOGY | Facility: HOSPITAL | Age: 59
DRG: 056 | End: 2024-04-11
Payer: COMMERCIAL

## 2024-04-11 ENCOUNTER — HOSPITAL ENCOUNTER (INPATIENT)
Facility: HOSPITAL | Age: 59
LOS: 4 days | Discharge: HOME | DRG: 056 | End: 2024-04-15
Attending: STUDENT IN AN ORGANIZED HEALTH CARE EDUCATION/TRAINING PROGRAM | Admitting: INTERNAL MEDICINE
Payer: COMMERCIAL

## 2024-04-11 DIAGNOSIS — R53.1 WEAKNESS: ICD-10-CM

## 2024-04-11 DIAGNOSIS — R44.3 HALLUCINATION: ICD-10-CM

## 2024-04-11 DIAGNOSIS — R42 DIZZINESS: Primary | ICD-10-CM

## 2024-04-11 LAB
ALBUMIN SERPL-MCNC: 3.6 G/DL (ref 3.5–5)
ALP BLD-CCNC: 171 U/L (ref 35–125)
ALT SERPL-CCNC: 20 U/L (ref 5–40)
ANION GAP SERPL CALC-SCNC: 14 MMOL/L
AST SERPL-CCNC: 19 U/L (ref 5–40)
BASOPHILS # BLD AUTO: 0.04 X10*3/UL (ref 0–0.1)
BASOPHILS NFR BLD AUTO: 0.7 %
BILIRUB SERPL-MCNC: <0.2 MG/DL (ref 0.1–1.2)
BUN SERPL-MCNC: 80 MG/DL (ref 8–25)
CALCIUM SERPL-MCNC: 9.8 MG/DL (ref 8.5–10.4)
CHLORIDE SERPL-SCNC: 98 MMOL/L (ref 97–107)
CO2 SERPL-SCNC: 24 MMOL/L (ref 24–31)
CREAT SERPL-MCNC: 10.5 MG/DL (ref 0.4–1.6)
EGFRCR SERPLBLD CKD-EPI 2021: 4 ML/MIN/1.73M*2
EOSINOPHIL # BLD AUTO: 0.14 X10*3/UL (ref 0–0.7)
EOSINOPHIL NFR BLD AUTO: 2.4 %
ERYTHROCYTE [DISTWIDTH] IN BLOOD BY AUTOMATED COUNT: 14.6 % (ref 11.5–14.5)
GLUCOSE BLD MANUAL STRIP-MCNC: 164 MG/DL (ref 74–99)
GLUCOSE BLD MANUAL STRIP-MCNC: 194 MG/DL (ref 74–99)
GLUCOSE SERPL-MCNC: 220 MG/DL (ref 65–99)
HCT VFR BLD AUTO: 29.1 % (ref 36–46)
HGB BLD-MCNC: 9.4 G/DL (ref 12–16)
IMM GRANULOCYTES # BLD AUTO: 0.02 X10*3/UL (ref 0–0.7)
IMM GRANULOCYTES NFR BLD AUTO: 0.3 % (ref 0–0.9)
LYMPHOCYTES # BLD AUTO: 1.77 X10*3/UL (ref 1.2–4.8)
LYMPHOCYTES NFR BLD AUTO: 30.1 %
MCH RBC QN AUTO: 29.7 PG (ref 26–34)
MCHC RBC AUTO-ENTMCNC: 32.3 G/DL (ref 32–36)
MCV RBC AUTO: 92 FL (ref 80–100)
MONOCYTES # BLD AUTO: 0.53 X10*3/UL (ref 0.1–1)
MONOCYTES NFR BLD AUTO: 9 %
NEUTROPHILS # BLD AUTO: 3.39 X10*3/UL (ref 1.2–7.7)
NEUTROPHILS NFR BLD AUTO: 57.5 %
NRBC BLD-RTO: 0 /100 WBCS (ref 0–0)
PLATELET # BLD AUTO: 177 X10*3/UL (ref 150–450)
POTASSIUM SERPL-SCNC: 5.3 MMOL/L (ref 3.4–5.1)
PROT SERPL-MCNC: 6.9 G/DL (ref 5.9–7.9)
RBC # BLD AUTO: 3.16 X10*6/UL (ref 4–5.2)
SODIUM SERPL-SCNC: 136 MMOL/L (ref 133–145)
TROPONIN T SERPL-MCNC: 359 NG/L
TROPONIN T SERPL-MCNC: 360 NG/L
TROPONIN T SERPL-MCNC: 365 NG/L
WBC # BLD AUTO: 5.9 X10*3/UL (ref 4.4–11.3)

## 2024-04-11 PROCEDURE — 87040 BLOOD CULTURE FOR BACTERIA: CPT | Mod: WESLAB | Performed by: STUDENT IN AN ORGANIZED HEALTH CARE EDUCATION/TRAINING PROGRAM

## 2024-04-11 PROCEDURE — 76377 3D RENDER W/INTRP POSTPROCES: CPT | Performed by: RADIOLOGY

## 2024-04-11 PROCEDURE — 70486 CT MAXILLOFACIAL W/O DYE: CPT | Performed by: RADIOLOGY

## 2024-04-11 PROCEDURE — 71045 X-RAY EXAM CHEST 1 VIEW: CPT | Performed by: STUDENT IN AN ORGANIZED HEALTH CARE EDUCATION/TRAINING PROGRAM

## 2024-04-11 PROCEDURE — 2500000001 HC RX 250 WO HCPCS SELF ADMINISTERED DRUGS (ALT 637 FOR MEDICARE OP): Performed by: STUDENT IN AN ORGANIZED HEALTH CARE EDUCATION/TRAINING PROGRAM

## 2024-04-11 PROCEDURE — 70450 CT HEAD/BRAIN W/O DYE: CPT | Performed by: RADIOLOGY

## 2024-04-11 PROCEDURE — 2500000001 HC RX 250 WO HCPCS SELF ADMINISTERED DRUGS (ALT 637 FOR MEDICARE OP): Performed by: INTERNAL MEDICINE

## 2024-04-11 PROCEDURE — 93005 ELECTROCARDIOGRAM TRACING: CPT

## 2024-04-11 PROCEDURE — 5A1D70Z PERFORMANCE OF URINARY FILTRATION, INTERMITTENT, LESS THAN 6 HOURS PER DAY: ICD-10-PCS | Performed by: INTERNAL MEDICINE

## 2024-04-11 PROCEDURE — 84484 ASSAY OF TROPONIN QUANT: CPT | Performed by: STUDENT IN AN ORGANIZED HEALTH CARE EDUCATION/TRAINING PROGRAM

## 2024-04-11 PROCEDURE — 71045 X-RAY EXAM CHEST 1 VIEW: CPT

## 2024-04-11 PROCEDURE — 85025 COMPLETE CBC W/AUTO DIFF WBC: CPT | Performed by: STUDENT IN AN ORGANIZED HEALTH CARE EDUCATION/TRAINING PROGRAM

## 2024-04-11 PROCEDURE — 70450 CT HEAD/BRAIN W/O DYE: CPT

## 2024-04-11 PROCEDURE — 2500000002 HC RX 250 W HCPCS SELF ADMINISTERED DRUGS (ALT 637 FOR MEDICARE OP, ALT 636 FOR OP/ED): Performed by: INTERNAL MEDICINE

## 2024-04-11 PROCEDURE — 82947 ASSAY GLUCOSE BLOOD QUANT: CPT

## 2024-04-11 PROCEDURE — 1210000001 HC SEMI-PRIVATE ROOM DAILY

## 2024-04-11 PROCEDURE — 36415 COLL VENOUS BLD VENIPUNCTURE: CPT | Performed by: STUDENT IN AN ORGANIZED HEALTH CARE EDUCATION/TRAINING PROGRAM

## 2024-04-11 PROCEDURE — 96374 THER/PROPH/DIAG INJ IV PUSH: CPT

## 2024-04-11 PROCEDURE — 99285 EMERGENCY DEPT VISIT HI MDM: CPT | Mod: 25

## 2024-04-11 PROCEDURE — 84075 ASSAY ALKALINE PHOSPHATASE: CPT | Performed by: STUDENT IN AN ORGANIZED HEALTH CARE EDUCATION/TRAINING PROGRAM

## 2024-04-11 PROCEDURE — 2500000004 HC RX 250 GENERAL PHARMACY W/ HCPCS (ALT 636 FOR OP/ED): Performed by: STUDENT IN AN ORGANIZED HEALTH CARE EDUCATION/TRAINING PROGRAM

## 2024-04-11 RX ORDER — HYDRALAZINE HYDROCHLORIDE 25 MG/1
25 TABLET, FILM COATED ORAL AS NEEDED
COMMUNITY

## 2024-04-11 RX ORDER — HYDRALAZINE HYDROCHLORIDE 25 MG/1
25 TABLET, FILM COATED ORAL 2 TIMES DAILY
Status: DISCONTINUED | OUTPATIENT
Start: 2024-04-11 | End: 2024-04-15 | Stop reason: HOSPADM

## 2024-04-11 RX ORDER — NYSTATIN 100000 [USP'U]/G
1 POWDER TOPICAL 3 TIMES DAILY
Status: DISCONTINUED | OUTPATIENT
Start: 2024-04-11 | End: 2024-04-15 | Stop reason: HOSPADM

## 2024-04-11 RX ORDER — ONDANSETRON HYDROCHLORIDE 2 MG/ML
4 INJECTION, SOLUTION INTRAVENOUS ONCE
Status: COMPLETED | OUTPATIENT
Start: 2024-04-11 | End: 2024-04-11

## 2024-04-11 RX ORDER — PANTOPRAZOLE SODIUM 40 MG/1
40 TABLET, DELAYED RELEASE ORAL
Status: DISCONTINUED | OUTPATIENT
Start: 2024-04-12 | End: 2024-04-15 | Stop reason: HOSPADM

## 2024-04-11 RX ORDER — ISOSORBIDE MONONITRATE 30 MG/1
30 TABLET, EXTENDED RELEASE ORAL DAILY
Status: DISCONTINUED | OUTPATIENT
Start: 2024-04-11 | End: 2024-04-15 | Stop reason: HOSPADM

## 2024-04-11 RX ORDER — INSULIN GLARGINE 100 [IU]/ML
15 INJECTION, SOLUTION SUBCUTANEOUS NIGHTLY
Status: DISCONTINUED | OUTPATIENT
Start: 2024-04-11 | End: 2024-04-15 | Stop reason: HOSPADM

## 2024-04-11 RX ORDER — DEXTROSE 50 % IN WATER (D50W) INTRAVENOUS SYRINGE
25
Status: DISCONTINUED | OUTPATIENT
Start: 2024-04-11 | End: 2024-04-15 | Stop reason: HOSPADM

## 2024-04-11 RX ORDER — DEXTROSE 50 % IN WATER (D50W) INTRAVENOUS SYRINGE
12.5
Status: DISCONTINUED | OUTPATIENT
Start: 2024-04-11 | End: 2024-04-15 | Stop reason: HOSPADM

## 2024-04-11 RX ORDER — HYDROXYZINE HYDROCHLORIDE 10 MG/1
10 TABLET, FILM COATED ORAL EVERY 6 HOURS PRN
Status: DISCONTINUED | OUTPATIENT
Start: 2024-04-11 | End: 2024-04-15 | Stop reason: HOSPADM

## 2024-04-11 RX ORDER — MECLIZINE HYDROCHLORIDE 25 MG/1
25 TABLET ORAL ONCE
Status: COMPLETED | OUTPATIENT
Start: 2024-04-11 | End: 2024-04-11

## 2024-04-11 RX ORDER — INSULIN LISPRO 100 [IU]/ML
0-5 INJECTION, SOLUTION INTRAVENOUS; SUBCUTANEOUS
Status: DISCONTINUED | OUTPATIENT
Start: 2024-04-11 | End: 2024-04-15 | Stop reason: HOSPADM

## 2024-04-11 RX ORDER — AMMONIUM LACTATE 12 G/100G
LOTION TOPICAL AS NEEDED
COMMUNITY

## 2024-04-11 RX ORDER — LIDOCAINE AND PRILOCAINE 25; 25 MG/G; MG/G
CREAM TOPICAL AS NEEDED
COMMUNITY
End: 2024-04-15 | Stop reason: HOSPADM

## 2024-04-11 RX ORDER — AMLODIPINE BESYLATE 5 MG/1
5 TABLET ORAL DAILY
Status: DISCONTINUED | OUTPATIENT
Start: 2024-04-11 | End: 2024-04-15 | Stop reason: HOSPADM

## 2024-04-11 RX ORDER — HALOPERIDOL 2 MG/1
2 TABLET ORAL ONCE
Status: COMPLETED | OUTPATIENT
Start: 2024-04-11 | End: 2024-04-11

## 2024-04-11 RX ADMIN — SITAGLIPTIN 25 MG: 50 TABLET, FILM COATED ORAL at 17:46

## 2024-04-11 RX ADMIN — INSULIN GLARGINE 15 UNITS: 100 INJECTION, SOLUTION SUBCUTANEOUS at 21:00

## 2024-04-11 RX ADMIN — ONDANSETRON 4 MG: 2 INJECTION INTRAMUSCULAR; INTRAVENOUS at 08:12

## 2024-04-11 RX ADMIN — HYDRALAZINE HYDROCHLORIDE 25 MG: 25 TABLET ORAL at 20:24

## 2024-04-11 RX ADMIN — INSULIN LISPRO 1 UNITS: 100 INJECTION, SOLUTION INTRAVENOUS; SUBCUTANEOUS at 18:58

## 2024-04-11 RX ADMIN — ISOSORBIDE MONONITRATE 30 MG: 30 TABLET, EXTENDED RELEASE ORAL at 17:46

## 2024-04-11 RX ADMIN — NYSTATIN 1 APPLICATION: 100000 POWDER TOPICAL at 20:27

## 2024-04-11 RX ADMIN — NYSTATIN 1 APPLICATION: 100000 POWDER TOPICAL at 16:00

## 2024-04-11 RX ADMIN — MECLIZINE HYDROCHLORIDE 25 MG: 25 TABLET ORAL at 08:12

## 2024-04-11 RX ADMIN — AMLODIPINE BESYLATE 5 MG: 5 TABLET ORAL at 17:46

## 2024-04-11 RX ADMIN — HALOPERIDOL 2 MG: 2 TABLET ORAL at 09:35

## 2024-04-11 ASSESSMENT — ACTIVITIES OF DAILY LIVING (ADL)
PATIENT'S MEMORY ADEQUATE TO SAFELY COMPLETE DAILY ACTIVITIES?: YES
ADEQUATE_TO_COMPLETE_ADL: YES
HEARING - RIGHT EAR: FUNCTIONAL
GROOMING: INDEPENDENT
BATHING: INDEPENDENT
FEEDING YOURSELF: INDEPENDENT
HEARING - LEFT EAR: FUNCTIONAL
DRESSING YOURSELF: INDEPENDENT
TOILETING: INDEPENDENT
JUDGMENT_ADEQUATE_SAFELY_COMPLETE_DAILY_ACTIVITIES: YES
WALKS IN HOME: INDEPENDENT

## 2024-04-11 ASSESSMENT — COGNITIVE AND FUNCTIONAL STATUS - GENERAL
MOVING FROM LYING ON BACK TO SITTING ON SIDE OF FLAT BED WITH BEDRAILS: A LOT
DRESSING REGULAR LOWER BODY CLOTHING: A LITTLE
STANDING UP FROM CHAIR USING ARMS: A LOT
WALKING IN HOSPITAL ROOM: A LOT
EATING MEALS: A LOT
DRESSING REGULAR UPPER BODY CLOTHING: A LOT
DRESSING REGULAR UPPER BODY CLOTHING: A LITTLE
CLIMB 3 TO 5 STEPS WITH RAILING: TOTAL
TURNING FROM BACK TO SIDE WHILE IN FLAT BAD: A LOT
DRESSING REGULAR LOWER BODY CLOTHING: A LOT
MOVING TO AND FROM BED TO CHAIR: A LOT
STANDING UP FROM CHAIR USING ARMS: A LOT
CLIMB 3 TO 5 STEPS WITH RAILING: A LOT
PATIENT BASELINE BEDBOUND: NO
TOILETING: A LOT
MOBILITY SCORE: 12
TOILETING: A LOT
HELP NEEDED FOR BATHING: A LOT
MOVING TO AND FROM BED TO CHAIR: A LOT
PERSONAL GROOMING: A LITTLE
WALKING IN HOSPITAL ROOM: A LOT
DAILY ACTIVITIY SCORE: 12
PERSONAL GROOMING: A LOT
TURNING FROM BACK TO SIDE WHILE IN FLAT BAD: A LOT
DAILY ACTIVITIY SCORE: 18
MOBILITY SCORE: 12
MOVING FROM LYING ON BACK TO SITTING ON SIDE OF FLAT BED WITH BEDRAILS: A LITTLE
HELP NEEDED FOR BATHING: A LITTLE

## 2024-04-11 ASSESSMENT — ENCOUNTER SYMPTOMS
NERVOUS/ANXIOUS: 1
ENDOCRINE NEGATIVE: 1
RESPIRATORY NEGATIVE: 1
EYES NEGATIVE: 1
ACTIVITY CHANGE: 1
ARTHRALGIAS: 1
CARDIOVASCULAR NEGATIVE: 1
HEMATOLOGIC/LYMPHATIC NEGATIVE: 1
GASTROINTESTINAL NEGATIVE: 1
DIZZINESS: 1
ALLERGIC/IMMUNOLOGIC NEGATIVE: 1
FATIGUE: 1

## 2024-04-11 ASSESSMENT — PAIN SCALES - GENERAL: PAINLEVEL_OUTOF10: 0 - NO PAIN

## 2024-04-11 ASSESSMENT — LIFESTYLE VARIABLES
HAVE PEOPLE ANNOYED YOU BY CRITICIZING YOUR DRINKING: NO
TOTAL SCORE: 0
HAVE YOU EVER FELT YOU SHOULD CUT DOWN ON YOUR DRINKING: NO
EVER HAD A DRINK FIRST THING IN THE MORNING TO STEADY YOUR NERVES TO GET RID OF A HANGOVER: NO
EVER FELT BAD OR GUILTY ABOUT YOUR DRINKING: NO

## 2024-04-11 ASSESSMENT — PAIN - FUNCTIONAL ASSESSMENT: PAIN_FUNCTIONAL_ASSESSMENT: 0-10

## 2024-04-11 NOTE — H&P
History Of Present Illness  Trisha Jackson is a 58 y.o. female presenting with stroke less that one week .   She was in [facility] discharged on  came in on Thursday for dizziness unable to sit on the edge of bed or walk  Past Medical History  She has a past medical history of Personal history of other diseases of the nervous system and sense organs (2014).  Surgical History  She has a past surgical history that includes Total vaginal hysterectomy (2013);  section, classic (2013); Hysterectomy (2013); and Total knee arthroplasty (2015).     Social History  She has no history on file for tobacco use, alcohol use, and drug use.    Family History  No family history on file.     Allergies  Egg and Statins-hmg-coa reductase inhibitors    Review of Systems   Constitutional:  Positive for activity change and fatigue.   HENT: Negative.     Eyes: Negative.    Respiratory: Negative.     Cardiovascular: Negative.    Gastrointestinal: Negative.    Endocrine: Negative.    Genitourinary: Negative.    Musculoskeletal:  Positive for arthralgias.   Allergic/Immunologic: Negative.    Neurological:  Positive for dizziness.   Hematological: Negative.    Psychiatric/Behavioral:  The patient is nervous/anxious.         Physical Exam  Constitutional:       Appearance: Normal appearance. She is obese.   HENT:      Head: Normocephalic and atraumatic.      Mouth/Throat:      Mouth: Mucous membranes are moist.   Eyes:      Extraocular Movements: Extraocular movements intact.      Conjunctiva/sclera: Conjunctivae normal.      Pupils: Pupils are equal, round, and reactive to light.   Cardiovascular:      Rate and Rhythm: Normal rate and regular rhythm.      Pulses: Normal pulses.      Heart sounds: Normal heart sounds.   Pulmonary:      Effort: Pulmonary effort is normal.      Breath sounds: Normal breath sounds.   Abdominal:      General: Bowel sounds are normal.      Palpations: Abdomen is soft.    Musculoskeletal:         General: Normal range of motion.      Cervical back: Normal range of motion and neck supple.   Skin:     General: Skin is warm.   Neurological:      General: No focal deficit present.      Mental Status: She is alert. Mental status is at baseline.      Comments: dizziness   Psychiatric:         Mood and Affect: Mood normal.          Last Recorded Vitals  /80 (BP Location: Left arm, Patient Position: Sitting)   Pulse 77   Temp 36.8 °C (98.2 °F) (Temporal)   Resp 16   Wt 140 kg (308 lb 6.8 oz)   SpO2 97%     Relevant Results             Assessment/Plan   Principal Problem:    Dizziness      Reccent stroke relapse vs orthostatic hypotenssion, check mri brai, consult neurology, check orthostatics       Jena Chandler MD

## 2024-04-11 NOTE — PROGRESS NOTES
Pharmacy Medication History Review    Trisha Jackson is a 58 y.o. female admitted for Dizziness. Pharmacy reviewed the patient's xunry-hp-[id number] medications and allergies for accuracy.    Medications ADDED:  Januvia 25mg  Hydralazine 25mg  Lidocaine/prilocaine cream  Ammonium lactate 12%  Medications CHANGED:  Gabapentin 300mg - PT reports BID  Humalog - PT reports if needed sliding scale usually 2U  Lantus - PT reports 5U  Lidocaine 5% patch - PRN  Medications REMOVED:   none     The list below reflects the updated PTA list. Comments regarding how patient may be taking medications differently can be found in the Admit Orders Activity  Prior to Admission Medications   Prescriptions Last Dose Informant   B complex-vitamin C-folic acid (Nephro-Daniel) 0.8 mg tablet 4/10/2024 Self   Sig: Take 1 tablet by mouth once daily.   SITagliptin phosphate (Januvia) 25 mg tablet 4/10/2024 Self   Sig: Take 1 tablet (25 mg) by mouth once daily.   amLODIPine (Norvasc) 5 mg tablet 4/10/2024 Self   Sig: Take 1 tablet (5 mg) by mouth once daily.   ammonium lactate (Lac-Hydrin) 12 % lotion  Self   Sig: Apply topically if needed for dry skin.   gabapentin (Neurontin) 300 mg capsule 4/10/2024 Self   Sig: Take 1 capsule (300 mg) by mouth 3 times a day for 3 days.   hydrALAZINE (Apresoline) 25 mg tablet  Self   Sig: Take 1 tablet (25 mg) by mouth if needed.   hydrOXYzine HCL (Atarax) 10 mg tablet 4/10/2024 Self   Sig: Take 1 tablet (10 mg) by mouth once daily.   insulin glargine (Lantus U-100 Insulin) 100 unit/mL injection  Self   Sig: Inject 15 Units under the skin once daily at bedtime. Take as directed per insulin instructions.   insulin lispro (HumaLOG) 100 unit/mL injection  Self   Sig: Inject under the skin 3 times a day with meals. Take as directed per insulin instructions.   isosorbide mononitrate ER (Imdur) 30 mg 24 hr tablet  Self   Sig: Take 1 tablet (30 mg) by mouth once daily. Do not crush or chew.   lidocaine (Lidoderm) 5 %  patch  Self   Sig: Place 1 patch over 12 hours on the skin once daily for 14 days. Remove & discard patch within 12 hours or as directed by MD.   lidocaine-prilocaine (Emla) cream  Self   Sig: Apply topically if needed for mild pain (1 - 3).   liraglutide (Victoza 2-Mark) 0.6 mg/0.1 mL (18 mg/3 mL) injection 4/10/2024 Self   Sig: Inject 0.3 mL (1.8 mg) under the skin once daily.   methocarbamol (Robaxin) 500 mg tablet 4/10/2024 Self   Sig: Take 1 tablet (500 mg) by mouth 2 times a day.   nystatin (Mycostatin) 100,000 unit/gram powder 4/10/2024 Self   Sig: Apply 1 Application topically 3 times a day.   omeprazole (PriLOSEC) 40 mg DR capsule 4/10/2024 Self   Sig: Take 1 capsule (40 mg) by mouth once daily in the morning. Take before meals. Do not crush or chew.   sucroferric oxyhydroxide (Velphoro) 500 mg tablet,chewable chewable tablet 4/10/2024 Self   Sig: Chew 3 tablets (1,500 mg) 3 times a day with meals.   tiZANidine (Zanaflex) 4 mg tablet     Sig: Take 1 tablet (4 mg) by mouth every 6 hours if needed for muscle spasms for up to 10 days.   torsemide (Demadex) 100 mg tablet 4/10/2024 Self   Sig: Take 1 tablet (100 mg) by mouth once daily.      Facility-Administered Medications: None        The list below reflects the updated allergy list. Please review each documented allergy for additional clarification and justification.  Allergies  Reviewed by Luz Maria Hawkins on 4/11/2024        Severity Reactions Comments    Egg Not Specified Swelling     Statins-hmg-coa Reductase Inhibitors Not Specified Hives             Pharmacy has been updated to [facility].    Sources used to complete the med history include dispense history, PTA medication list, AVS, patient interview. Patient is a fair historian.    Below are additional concerns with the patient's PTA list.  Patient unclear on hydroxyzine and hydralazine. What patient reports for usage does not correspond with dispense quantities and day supplies for most  current dispenses of either drug.     Luz Maria Hawkins  Please reach out via [facility] Secure Chat for questions

## 2024-04-11 NOTE — NURSING NOTE
Pt takes meds whole. LPN sent message to Dr. Chandler regarding if we need to straight cath patient for urine sample. Awaiting response.

## 2024-04-11 NOTE — NURSING NOTE
Pt arrived onto unit. LPN received report from AGNIESZKA Bustamante. Pt is alert x2. Pt just had a stroke and was discharged from [facility] on 04/09. Pt arrived on 04/11 for dizziness and being unable to sit on the edge of bed or unable to walk. Pt needs urine specimen to be sent for urine culture and drug screen. At this time pt has not urinated in whole hospital visit. Pt is also end stage kidney failure, pt last dialysis on 04/09 (unaware of amount taken off) Pt has port in right upper chest.  Pt is asleep in bed at this time. At this time pt is on BED REST.

## 2024-04-11 NOTE — CONSULTS
Inpatient consult to Neurology  Consult performed by: Reagan Murray MD  Consult ordered by: Jena Chandler MD      Chief complaints: Lightheadedness with dizziness since this morning with history of recent CVA    History Of Present Illness  Trisha Jackson is a 58 y.o. female presenting with dizziness since this morning.  Patient has a known history of hypertension, diabetes who was recently discharged from hospital for acute CVA this week as per the patient admitted with lightheadedness upon awakening today.  Patient is a poor historian.  States that she was discharged home on aspirin and is unable to take statin due to allergies.  Patient denies any new onset weakness, numbness, visual disturbances, speech disturbances, nausea, vomiting or any vertigo.  Patient complains of lightheadedness mostly upon standing.  Upon arrival to the ED, patient underwent CT scan of the brain which did not show any acute findings.  Patient was admitted for further evaluation management.     Past Medical History  She has a past medical history of Personal history of other diseases of the nervous system and sense organs (2014).  Hypertension, diabetes, GERD, CVA    Surgical History  She has a past surgical history that includes Total vaginal hysterectomy (2013);  section, classic (2013); Hysterectomy (2013); and Total knee arthroplasty (2015).     Social History  She has no history on file for tobacco use, alcohol use, and drug use.     Family history: No significant family history is noted    Allergies  Egg and Statins-hmg-coa reductase inhibitors    Medications  Medications Prior to Admission   Medication Sig Dispense Refill Last Dose    amLODIPine (Norvasc) 5 mg tablet Take 1 tablet (5 mg) by mouth once daily.   4/10/2024    B complex-vitamin C-folic acid (Nephro-Daniel) 0.8 mg tablet Take 1 tablet by mouth once daily.   4/10/2024    gabapentin (Neurontin) 300 mg capsule Take 1 capsule  (300 mg) by mouth 3 times a day for 3 days. 9 capsule 0 4/10/2024    hydrOXYzine HCL (Atarax) 10 mg tablet Take 1 tablet (10 mg) by mouth once daily.   4/10/2024    liraglutide (Victoza 2-Mark) 0.6 mg/0.1 mL (18 mg/3 mL) injection Inject 0.3 mL (1.8 mg) under the skin once daily.   4/10/2024    methocarbamol (Robaxin) 500 mg tablet Take 1 tablet (500 mg) by mouth 2 times a day.   4/10/2024    nystatin (Mycostatin) 100,000 unit/gram powder Apply 1 Application topically 3 times a day.   4/10/2024    omeprazole (PriLOSEC) 40 mg DR capsule Take 1 capsule (40 mg) by mouth once daily in the morning. Take before meals. Do not crush or chew.   4/10/2024    SITagliptin phosphate (Januvia) 25 mg tablet Take 1 tablet (25 mg) by mouth once daily.   4/10/2024    sucroferric oxyhydroxide (Velphoro) 500 mg tablet,chewable chewable tablet Chew 3 tablets (1,500 mg) 3 times a day with meals.   4/10/2024    torsemide (Demadex) 100 mg tablet Take 1 tablet (100 mg) by mouth once daily.   4/10/2024    ammonium lactate (Lac-Hydrin) 12 % lotion Apply topically if needed for dry skin.       hydrALAZINE (Apresoline) 25 mg tablet Take 1 tablet (25 mg) by mouth if needed.       insulin glargine (Lantus U-100 Insulin) 100 unit/mL injection Inject 15 Units under the skin once daily at bedtime. Take as directed per insulin instructions.       insulin lispro (HumaLOG) 100 unit/mL injection Inject under the skin 3 times a day with meals. Take as directed per insulin instructions.       isosorbide mononitrate ER (Imdur) 30 mg 24 hr tablet Take 1 tablet (30 mg) by mouth once daily. Do not crush or chew.       lidocaine (Lidoderm) 5 % patch Place 1 patch over 12 hours on the skin once daily for 14 days. Remove & discard patch within 12 hours or as directed by MD. 14 patch 0     lidocaine-prilocaine (Emla) cream Apply topically if needed for mild pain (1 - 3).       tiZANidine (Zanaflex) 4 mg tablet Take 1 tablet (4 mg) by mouth every 6 hours if needed  "for muscle spasms for up to 10 days. 30 tablet 0        Review of Systems  14 point review of systems was reviewed and negative except as noted above.    Neurological Exam  Physical Exam  Mental Status :  Alert , O x 2  Attention and concentration normal      Speech : Clear , fluent  No aphasia or dysarthria noted    CN 2-12 :  Pupils round , regular reacting to light  Fundus could not be assessed  VA intact, EOM intact  Facial sensation intact  Mild right facial asymmetry noted  Hearing acuity intact bilaterally  Tongue midline  Shoulder shrug intact    Motor:  Strength moves all extremities against gravity  Normal bulk and tone    Sensory:  Intact to light touch    Reflexes : 1+  symmetric with equivocal toes    Coordination : Intact to Finger to Nose    Gait : Unable to assess.      Last Recorded Vitals   Blood pressure 118/50, pulse 79, temperature 36.3 °C (97.3 °F), temperature source Oral, resp. rate 20, height 1.702 m (5' 7\"), weight 140 kg (308 lb 6.8 oz), SpO2 97%.    Relevant Results  CT head wo IV contrast    Result Date: 4/11/2024  Interpreted By:  Stanford Schulz, STUDY: CT HEAD WO IV CONTRAST;  4/11/2024 9:35 am   INDICATION: Signs/Symptoms:weakness.   COMPARISON: Head CT 03/29/2024.   ACCESSION NUMBER(S): [id number]   ORDERING CLINICIAN: CALLY ONEAL   TECHNIQUE: Noncontrast volumetric CT scan of head and facial bones was performed. Angled reformats in brain and bone windows were generated. The images were reviewed in bone, brain, blood and soft tissue windows. Three-dimensional reformations were processed of the facial bones on a separate workstation.   FINDINGS: CSF Spaces: The ventricles, sulci and basal cisterns are within normal limits. There is no extraaxial fluid collection.   Parenchyma:  The grey-white differentiation is intact. There is no mass effect or midline shift.  There is no intracranial hemorrhage.   Calvarium: No acute displaced calvarial fracture. Mild hyperostosis frontalis " interna.   Facial bones, paranasal sinuses and mastoids: Minimal fluid level or dependent opacification of the right posterior ethmoid sinus, incompletely assessed. Imaged mastoid air cells appear clear.       No CT evidence of acute intracranial pathology.     MACRO: None   Signed by: Stanford Schulz 4/11/2024 10:22 AM Dictation workstation:   [id number]    XR chest 1 view    Result Date: 4/11/2024  Interpreted By:  Leonard Kemp, STUDY: XR CHEST 1 VIEW;  4/11/2024 9:31 am   INDICATION: Signs/Symptoms:weakness.   COMPARISON: Chest CT 03/29/2024   ACCESSION NUMBER(S): [id number]   ORDERING CLINICIAN: CALLY ONEAL   FINDINGS: Two-view chest   DEVICES: Right central venous catheter.   CARDIOMEDIASTINAL SILHOUETTE: Cardiomediastinal silhouette is stable in size and configuration.No significant atherosclerotic calcification.   LUNGS: Bilateral interstitial thickening, concerning for vascular congestion. No pneumothorax. No pleural effusion.   BONES: No acute osseous changes.       1.  Findings suggestive of vascular congestion.     Signed by: Leonard Kemp 4/11/2024 10:05 AM Dictation workstation:   [id number]    ECG 12 Lead    Result Date: 4/11/2024  Normal sinus rhythm Septal infarct (cited on or before 08-DEC-2014) Abnormal ECG When compared with ECG of 26-SEP-2015 16:58, Vent. rate has decreased BY  45 BPM Questionable change in QRS axis          Assessment/Plan     Principal Problem:    Dizziness  Patient is a 58-year-old female with known history of hypertension, diabetes, was admitted for CVA this week and discharged from an outside facility on aspirin and unable to take statin due to allergy, admitted with dizziness upon awakening with lightheadedness, rule out orthostatic hypotension and new onset CVA as well would be in the differential.  Will also need to rule out sepsis, metabolic and other causes.      Recommendations;  Reviewed CT scan brain   Will get records from her recent discharge for  CVA  MRI brain pending  Start DAPT therapy with Aspirin / Plavix X 21 days and then asa 81 mg alone  Patient unable to take statin due to allergies  Will review 2 d echo from her recent hospital visit and if not we will consider repeating it but will hold off for now  PT/OT eval and treatment  Speech eval and treatment  IV fluids  Orthostatic blood pressure checks  Neurochecks  Permissive Blood pressure as per stroke protocol  Blood pressure / Blood sugar monitoring and control  Sepsis workup  Telemetry monitoring   Continue current management  Discussed the above plan with the patient

## 2024-04-11 NOTE — ED PROVIDER NOTES
HPI   Chief Complaint   Patient presents with    Weakness, Gen     Stroke recently. Pt wokeup complaining of weakness on both sides of body and confusion       Patient presents complaining of dizziness.  She states that she recently had a stroke and her left leg has been weak since then.  She lives at home with her son, who called EMS.  Patient was not able to stand up at all.  She is weak all over.  She reports that she has been nauseated but has not vomited.  Patient does have end-stage renal disease on dialysis.                          Astrid Coma Scale Score: 15         NIH Stroke Scale: 9             Patient History   Past Medical History:   Diagnosis Date    Personal history of other diseases of the nervous system and sense organs 2014    History of myopia     Past Surgical History:   Procedure Laterality Date     SECTION, CLASSIC  2013     Section    HYSTERECTOMY  2013    Hysterectomy    TOTAL KNEE ARTHROPLASTY  2015    Knee Replacement    TOTAL VAGINAL HYSTERECTOMY  2013    Vaginal Hysterectomy     No family history on file.  Social History     Tobacco Use    Smoking status: Not on file    Smokeless tobacco: Not on file   Substance Use Topics    Alcohol use: Not on file    Drug use: Not on file       Physical Exam   ED Triage Vitals   Temp Pulse Resp BP   -- -- -- --      SpO2 Temp src Heart Rate Source Patient Position   -- -- -- --      BP Location FiO2 (%)     -- --       Physical Exam  HENT:      Head: Normocephalic.   Eyes:      Extraocular Movements: Extraocular movements intact.      Pupils: Pupils are equal, round, and reactive to light.   Pulmonary:      Effort: Pulmonary effort is normal.   Neurological:      Mental Status: She is alert.      Comments: Global weakness patient is awake, alert, answers questions appropriately.         ED Course & [facility]   ED Course as of 24 [address] [place] [address] 2024   0889 EKG interpretation: Normal sinus rhythm, rate  78.  Normal axis.  No ST or T wave abnormalities. [ML]      ED Course User Index  [ML] Kurtis Dave MD         Diagnoses as of 04/11/24 1038   Dizziness   Weakness   Hallucination       Medical Decision Making  Head CT without acute findings.  Laboratory studies reveal elevated troponin felt to be secondary to end-stage renal disease.  Electrolytes unremarkable.  White blood cell count without acute findings.  Chest x-ray does not reveal any signs of pneumonia.  In setting of new hallucinations and weakness, patient accepted to medicine service for further management.  Parts of this chart were completed with dictation software, please excuse any errors in transcription.        Procedure  Procedures     Kurtis Dave MD  04/11/24 1038

## 2024-04-12 ENCOUNTER — APPOINTMENT (OUTPATIENT)
Dept: DIALYSIS | Facility: HOSPITAL | Age: 59
End: 2024-04-12
Payer: MEDICARE

## 2024-04-12 ENCOUNTER — APPOINTMENT (OUTPATIENT)
Dept: RADIOLOGY | Facility: HOSPITAL | Age: 59
DRG: 056 | End: 2024-04-12
Payer: COMMERCIAL

## 2024-04-12 LAB
GLUCOSE BLD MANUAL STRIP-MCNC: 124 MG/DL (ref 74–99)
GLUCOSE BLD MANUAL STRIP-MCNC: 136 MG/DL (ref 74–99)
GLUCOSE BLD MANUAL STRIP-MCNC: 159 MG/DL (ref 74–99)
GLUCOSE BLD MANUAL STRIP-MCNC: 179 MG/DL (ref 74–99)
IRON SATN MFR SERPL: 65 % (ref 12–50)
IRON SERPL-MCNC: 93 UG/DL (ref 30–160)
TIBC SERPL-MCNC: 142 UG/DL (ref 228–428)
UIBC SERPL-MCNC: 49 UG/DL (ref 110–370)

## 2024-04-12 PROCEDURE — 70551 MRI BRAIN STEM W/O DYE: CPT

## 2024-04-12 PROCEDURE — 80307 DRUG TEST PRSMV CHEM ANLYZR: CPT | Performed by: STUDENT IN AN ORGANIZED HEALTH CARE EDUCATION/TRAINING PROGRAM

## 2024-04-12 PROCEDURE — 2500000002 HC RX 250 W HCPCS SELF ADMINISTERED DRUGS (ALT 637 FOR MEDICARE OP, ALT 636 FOR OP/ED): Performed by: INTERNAL MEDICINE

## 2024-04-12 PROCEDURE — 1210000001 HC SEMI-PRIVATE ROOM DAILY

## 2024-04-12 PROCEDURE — 82947 ASSAY GLUCOSE BLOOD QUANT: CPT

## 2024-04-12 PROCEDURE — 87186 SC STD MICRODIL/AGAR DIL: CPT | Mod: WESLAB | Performed by: STUDENT IN AN ORGANIZED HEALTH CARE EDUCATION/TRAINING PROGRAM

## 2024-04-12 PROCEDURE — 8010000001 HC DIALYSIS - HEMODIALYSIS PER DAY

## 2024-04-12 PROCEDURE — 6350000001 HC RX 635 EPOETIN >10,000 UNITS: Performed by: INTERNAL MEDICINE

## 2024-04-12 PROCEDURE — 2500000001 HC RX 250 WO HCPCS SELF ADMINISTERED DRUGS (ALT 637 FOR MEDICARE OP): Performed by: INTERNAL MEDICINE

## 2024-04-12 PROCEDURE — 81001 URINALYSIS AUTO W/SCOPE: CPT | Performed by: STUDENT IN AN ORGANIZED HEALTH CARE EDUCATION/TRAINING PROGRAM

## 2024-04-12 PROCEDURE — 83540 ASSAY OF IRON: CPT | Performed by: INTERNAL MEDICINE

## 2024-04-12 PROCEDURE — 70551 MRI BRAIN STEM W/O DYE: CPT | Performed by: RADIOLOGY

## 2024-04-12 RX ORDER — HEPARIN SODIUM 1000 [USP'U]/ML
1000 INJECTION, SOLUTION INTRAVENOUS; SUBCUTANEOUS
Status: CANCELLED | OUTPATIENT
Start: 2024-04-12

## 2024-04-12 RX ORDER — ACETAMINOPHEN 325 MG/1
650 TABLET ORAL EVERY 6 HOURS PRN
Status: DISCONTINUED | OUTPATIENT
Start: 2024-04-12 | End: 2024-04-15 | Stop reason: HOSPADM

## 2024-04-12 RX ADMIN — SITAGLIPTIN 25 MG: 50 TABLET, FILM COATED ORAL at 08:32

## 2024-04-12 RX ADMIN — INSULIN GLARGINE 15 UNITS: 100 INJECTION, SOLUTION SUBCUTANEOUS at 21:33

## 2024-04-12 RX ADMIN — NYSTATIN 1 APPLICATION: 100000 POWDER TOPICAL at 21:44

## 2024-04-12 RX ADMIN — HYDRALAZINE HYDROCHLORIDE 25 MG: 25 TABLET ORAL at 21:44

## 2024-04-12 RX ADMIN — ACETAMINOPHEN 650 MG: 325 TABLET ORAL at 08:31

## 2024-04-12 RX ADMIN — NYSTATIN 1 APPLICATION: 100000 POWDER TOPICAL at 15:00

## 2024-04-12 RX ADMIN — HYDRALAZINE HYDROCHLORIDE 25 MG: 25 TABLET ORAL at 08:32

## 2024-04-12 RX ADMIN — EPOETIN ALFA-EPBX 5000 UNITS: 20000 INJECTION, SOLUTION INTRAVENOUS; SUBCUTANEOUS at 18:02

## 2024-04-12 RX ADMIN — ACETAMINOPHEN 650 MG: 325 TABLET ORAL at 18:02

## 2024-04-12 RX ADMIN — INSULIN LISPRO 1 UNITS: 100 INJECTION, SOLUTION INTRAVENOUS; SUBCUTANEOUS at 12:54

## 2024-04-12 RX ADMIN — NYSTATIN 1 APPLICATION: 100000 POWDER TOPICAL at 09:00

## 2024-04-12 RX ADMIN — ISOSORBIDE MONONITRATE 30 MG: 30 TABLET, EXTENDED RELEASE ORAL at 08:32

## 2024-04-12 RX ADMIN — HYDROXYZINE HYDROCHLORIDE 10 MG: 10 TABLET, FILM COATED ORAL at 11:21

## 2024-04-12 RX ADMIN — AMLODIPINE BESYLATE 5 MG: 5 TABLET ORAL at 08:32

## 2024-04-12 RX ADMIN — PANTOPRAZOLE SODIUM 40 MG: 40 TABLET, DELAYED RELEASE ORAL at 07:06

## 2024-04-12 RX ADMIN — ASCORBIC ACID, THIAMINE MONONITRATE,RIBOFLAVIN, NIACINAMIDE, PYRIDOXINE HYDROCHLORIDE, FOLIC ACID, CYANOCOBALAMIN, BIOTIN, CALCIUM PANTOTHENATE, 1 CAPSULE: 100; 1.5; 1.7; 20; 10; 1; 6000; 150000; 5 CAPSULE, LIQUID FILLED ORAL at 12:54

## 2024-04-12 SDOH — ECONOMIC STABILITY: INCOME INSECURITY: IN THE LAST 12 MONTHS, WAS THERE A TIME WHEN YOU WERE NOT ABLE TO PAY THE MORTGAGE OR RENT ON TIME?: NO

## 2024-04-12 SDOH — SOCIAL STABILITY: SOCIAL INSECURITY
WITHIN THE LAST YEAR, HAVE YOU BEEN RAPED OR FORCED TO HAVE ANY KIND OF SEXUAL ACTIVITY BY YOUR PARTNER OR EX-PARTNER?: NO

## 2024-04-12 SDOH — HEALTH STABILITY: PHYSICAL HEALTH
HOW OFTEN DO YOU NEED TO HAVE SOMEONE HELP YOU WHEN YOU READ INSTRUCTIONS, PAMPHLETS, OR OTHER WRITTEN MATERIAL FROM YOUR DOCTOR OR PHARMACY?: NEVER

## 2024-04-12 SDOH — HEALTH STABILITY: PHYSICAL HEALTH: ON AVERAGE, HOW MANY MINUTES DO YOU ENGAGE IN EXERCISE AT THIS LEVEL?: 0 MIN

## 2024-04-12 SDOH — SOCIAL STABILITY: SOCIAL NETWORK
IN A TYPICAL WEEK, HOW MANY TIMES DO YOU TALK ON THE PHONE WITH FAMILY, FRIENDS, OR NEIGHBORS?: MORE THAN THREE TIMES A WEEK

## 2024-04-12 SDOH — SOCIAL STABILITY: SOCIAL INSECURITY
WITHIN THE LAST YEAR, HAVE TO BEEN RAPED OR FORCED TO HAVE ANY KIND OF SEXUAL ACTIVITY BY YOUR PARTNER OR EX-PARTNER?: NO

## 2024-04-12 SDOH — ECONOMIC STABILITY: INCOME INSECURITY: IN THE PAST 12 MONTHS HAS THE ELECTRIC, GAS, OIL, OR WATER COMPANY THREATENED TO SHUT OFF SERVICES IN YOUR HOME?: NO

## 2024-04-12 SDOH — SOCIAL STABILITY: SOCIAL INSECURITY: WITHIN THE LAST YEAR, HAVE YOU BEEN AFRAID OF YOUR PARTNER OR EX-PARTNER?: NO

## 2024-04-12 SDOH — SOCIAL STABILITY: SOCIAL NETWORK: ARE YOU MARRIED, WIDOWED, DIVORCED, SEPARATED, NEVER MARRIED, OR LIVING WITH A PARTNER?: NEVER MARRIED

## 2024-04-12 SDOH — SOCIAL STABILITY: SOCIAL NETWORK
DO YOU BELONG TO ANY CLUBS OR ORGANIZATIONS SUCH AS CHURCH GROUPS, UNIONS, FRATERNAL OR ATHLETIC GROUPS, OR SCHOOL GROUPS?: NO

## 2024-04-12 SDOH — ECONOMIC STABILITY: HOUSING INSECURITY
IN THE LAST 12 MONTHS, WAS THERE A TIME WHEN YOU DID NOT HAVE A STEADY PLACE TO SLEEP OR SLEPT IN A SHELTER (INCLUDING NOW)?: NO

## 2024-04-12 SDOH — HEALTH STABILITY: MENTAL HEALTH: HOW OFTEN DO YOU HAVE 6 OR MORE DRINKS ON ONE OCCASION?: NEVER

## 2024-04-12 SDOH — ECONOMIC STABILITY: FOOD INSECURITY: WITHIN THE PAST 12 MONTHS, THE FOOD YOU BOUGHT JUST DIDN'T LAST AND YOU DIDN'T HAVE MONEY TO GET MORE.: NEVER TRUE

## 2024-04-12 SDOH — HEALTH STABILITY: MENTAL HEALTH
DO YOU FEEL STRESS - TENSE, RESTLESS, NERVOUS, OR ANXIOUS, OR UNABLE TO SLEEP AT NIGHT BECAUSE YOUR MIND IS TROUBLED ALL THE TIME - THESE DAYS?: ONLY A LITTLE

## 2024-04-12 SDOH — ECONOMIC STABILITY: FOOD INSECURITY: HOW HARD IS IT FOR YOU TO PAY FOR THE VERY BASICS LIKE FOOD, HOUSING, MEDICAL CARE, AND HEATING?: NOT VERY HARD

## 2024-04-12 SDOH — HEALTH STABILITY: MENTAL HEALTH: HOW OFTEN DO YOU HAVE A DRINK CONTAINING ALCOHOL?: NEVER

## 2024-04-12 SDOH — SOCIAL STABILITY: SOCIAL INSECURITY
WITHIN THE LAST YEAR, HAVE YOU BEEN KICKED, HIT, SLAPPED, OR OTHERWISE PHYSICALLY HURT BY YOUR PARTNER OR EX-PARTNER?: NO

## 2024-04-12 SDOH — SOCIAL STABILITY: SOCIAL INSECURITY: ARE YOU MARRIED, WIDOWED, DIVORCED, SEPARATED, NEVER MARRIED, OR LIVING WITH A PARTNER?: NEVER MARRIED

## 2024-04-12 SDOH — SOCIAL STABILITY: SOCIAL NETWORK: HOW OFTEN DO YOU GET TOGETHER WITH FRIENDS OR RELATIVES?: MORE THAN THREE TIMES A WEEK

## 2024-04-12 SDOH — HEALTH STABILITY: MENTAL HEALTH
STRESS IS WHEN SOMEONE FEELS TENSE, NERVOUS, ANXIOUS, OR CAN'T SLEEP AT NIGHT BECAUSE THEIR MIND IS TROUBLED. HOW STRESSED ARE YOU?: ONLY A LITTLE

## 2024-04-12 SDOH — ECONOMIC STABILITY: INCOME INSECURITY: IN THE PAST 12 MONTHS, HAS THE ELECTRIC, GAS, OIL, OR WATER COMPANY THREATENED TO SHUT OFF SERVICE IN YOUR HOME?: NO

## 2024-04-12 SDOH — ECONOMIC STABILITY: TRANSPORTATION INSECURITY
IN THE PAST 12 MONTHS, HAS LACK OF TRANSPORTATION KEPT YOU FROM MEETINGS, WORK, OR FROM GETTING THINGS NEEDED FOR DAILY LIVING?: NO

## 2024-04-12 SDOH — SOCIAL STABILITY: SOCIAL NETWORK
DO YOU BELONG TO ANY CLUBS OR ORGANIZATIONS SUCH AS CHURCH GROUPS UNIONS, FRATERNAL OR ATHLETIC GROUPS, OR SCHOOL GROUPS?: NO

## 2024-04-12 SDOH — SOCIAL STABILITY: SOCIAL INSECURITY: WITHIN THE LAST YEAR, HAVE YOU BEEN HUMILIATED OR EMOTIONALLY ABUSED IN OTHER WAYS BY YOUR PARTNER OR EX-PARTNER?: NO

## 2024-04-12 SDOH — ECONOMIC STABILITY: HOUSING INSECURITY: IN THE LAST 12 MONTHS, WAS THERE A TIME WHEN YOU WERE NOT ABLE TO PAY THE MORTGAGE OR RENT ON TIME?: NO

## 2024-04-12 SDOH — HEALTH STABILITY: MENTAL HEALTH: HOW OFTEN DO YOU HAVE SIX OR MORE DRINKS ON ONE OCCASION?: NEVER

## 2024-04-12 SDOH — ECONOMIC STABILITY: FOOD INSECURITY: WITHIN THE PAST 12 MONTHS, YOU WORRIED THAT YOUR FOOD WOULD RUN OUT BEFORE YOU GOT MONEY TO BUY MORE.: NEVER TRUE

## 2024-04-12 SDOH — ECONOMIC STABILITY: INCOME INSECURITY: HOW HARD IS IT FOR YOU TO PAY FOR THE VERY BASICS LIKE FOOD, HOUSING, MEDICAL CARE, AND HEATING?: NOT VERY HARD

## 2024-04-12 SDOH — ECONOMIC STABILITY: FOOD INSECURITY: WITHIN THE PAST 12 MONTHS, YOU WORRIED THAT YOUR FOOD WOULD RUN OUT BEFORE YOU GOT THE MONEY TO BUY MORE.: NEVER TRUE

## 2024-04-12 SDOH — SOCIAL STABILITY: SOCIAL NETWORK: HOW OFTEN DO YOU ATTENT MEETINGS OF THE CLUB OR ORGANIZATION YOU BELONG TO?: NEVER

## 2024-04-12 SDOH — ECONOMIC STABILITY: TRANSPORTATION INSECURITY: IN THE PAST 12 MONTHS, HAS LACK OF TRANSPORTATION KEPT YOU FROM MEDICAL APPOINTMENTS OR FROM GETTING MEDICATIONS?: NO

## 2024-04-12 SDOH — SOCIAL STABILITY: SOCIAL NETWORK: HOW OFTEN DO YOU ATTEND MEETINGS OF THE CLUBS OR ORGANIZATIONS YOU BELONG TO?: NEVER

## 2024-04-12 SDOH — HEALTH STABILITY: MENTAL HEALTH: HOW MANY STANDARD DRINKS CONTAINING ALCOHOL DO YOU HAVE ON A TYPICAL DAY?: PATIENT DOES NOT DRINK

## 2024-04-12 SDOH — ECONOMIC STABILITY: TRANSPORTATION INSECURITY
IN THE PAST 12 MONTHS, HAS THE LACK OF TRANSPORTATION KEPT YOU FROM MEDICAL APPOINTMENTS OR FROM GETTING MEDICATIONS?: NO

## 2024-04-12 SDOH — HEALTH STABILITY: MENTAL HEALTH: HOW MANY DRINKS CONTAINING ALCOHOL DO YOU HAVE ON A TYPICAL DAY WHEN YOU ARE DRINKING?: PATIENT DOES NOT DRINK

## 2024-04-12 ASSESSMENT — COGNITIVE AND FUNCTIONAL STATUS - GENERAL
PERSONAL GROOMING: A LOT
TURNING FROM BACK TO SIDE WHILE IN FLAT BAD: A LOT
HELP NEEDED FOR BATHING: A LOT
CLIMB 3 TO 5 STEPS WITH RAILING: A LOT
DRESSING REGULAR LOWER BODY CLOTHING: A LOT
DAILY ACTIVITIY SCORE: 13
TURNING FROM BACK TO SIDE WHILE IN FLAT BAD: A LOT
WALKING IN HOSPITAL ROOM: A LOT
DRESSING REGULAR LOWER BODY CLOTHING: A LOT
TOILETING: A LOT
PERSONAL GROOMING: A LOT
TOILETING: A LOT
STANDING UP FROM CHAIR USING ARMS: A LOT
MOVING TO AND FROM BED TO CHAIR: A LOT
HELP NEEDED FOR BATHING: A LOT
DAILY ACTIVITIY SCORE: 13
WALKING IN HOSPITAL ROOM: A LOT
EATING MEALS: A LITTLE
MOVING FROM LYING ON BACK TO SITTING ON SIDE OF FLAT BED WITH BEDRAILS: A LOT
MOVING FROM LYING ON BACK TO SITTING ON SIDE OF FLAT BED WITH BEDRAILS: A LOT
EATING MEALS: A LITTLE
MOBILITY SCORE: 12
DRESSING REGULAR UPPER BODY CLOTHING: A LOT
DRESSING REGULAR UPPER BODY CLOTHING: A LOT
MOBILITY SCORE: 12
CLIMB 3 TO 5 STEPS WITH RAILING: A LOT
STANDING UP FROM CHAIR USING ARMS: A LOT
MOVING TO AND FROM BED TO CHAIR: A LOT

## 2024-04-12 ASSESSMENT — LIFESTYLE VARIABLES
AUDIT-C TOTAL SCORE: 0
SKIP TO QUESTIONS 9-10: 1

## 2024-04-12 ASSESSMENT — ENCOUNTER SYMPTOMS
ALLERGIC/IMMUNOLOGIC NEGATIVE: 1
EYES NEGATIVE: 1
ENDOCRINE NEGATIVE: 1
HEMATOLOGIC/LYMPHATIC NEGATIVE: 1
RESPIRATORY NEGATIVE: 1
MUSCULOSKELETAL NEGATIVE: 1
CARDIOVASCULAR NEGATIVE: 1
CONSTITUTIONAL NEGATIVE: 1
GASTROINTESTINAL NEGATIVE: 1
NEUROLOGICAL NEGATIVE: 1
PSYCHIATRIC NEGATIVE: 1

## 2024-04-12 ASSESSMENT — PAIN DESCRIPTION - LOCATION: LOCATION: HAND

## 2024-04-12 ASSESSMENT — PAIN SCALES - GENERAL
PAINLEVEL_OUTOF10: 8
PAINLEVEL_OUTOF10: 0 - NO PAIN
PAINLEVEL_OUTOF10: 4
PAINLEVEL_OUTOF10: 0 - NO PAIN

## 2024-04-12 ASSESSMENT — ACTIVITIES OF DAILY LIVING (ADL): LACK_OF_TRANSPORTATION: NO

## 2024-04-12 ASSESSMENT — PAIN DESCRIPTION - ORIENTATION: ORIENTATION: RIGHT;LEFT

## 2024-04-12 ASSESSMENT — PAIN - FUNCTIONAL ASSESSMENT
PAIN_FUNCTIONAL_ASSESSMENT: 0-10

## 2024-04-12 NOTE — CARE PLAN
Problem: Pain  Goal: My pain/discomfort is manageable  Outcome: Progressing     Problem: Safety  Goal: Patient will be injury free during hospitalization  Outcome: Progressing  Goal: I will remain free of falls  Outcome: Progressing     Problem: Daily Care  Goal: Daily care needs are met  Outcome: Progressing     Problem: Psychosocial Needs  Goal: Demonstrates ability to cope with hospitalization/illness  Outcome: Progressing  Goal: Collaborate with me, my family, and caregiver to identify my specific goals  Outcome: Progressing   The patient's goals for the shift include      The clinical goals for the shift include remain safe throughout shift

## 2024-04-12 NOTE — PROGRESS NOTES
04/12/24 1818   Discharge Planning   Patient expects to be discharged to: Home     Declining home going needs

## 2024-04-12 NOTE — PROGRESS NOTES
Trisha Jackson is a 58 y.o. female on day 1 of admission presenting with Dizziness.      Subjective   Patient seen and examined.  Patient currently in dialysis.  States that her dizziness has been improving.  More alert and interactive today.  Denies any new complaints.  Underwent MRI of the brain consistent with her multifocal infarcts.  States that she had an episode of low blood pressure during dialysis during last Friday.  Since then, patient states that her symptoms worsened which prompted her to seek medical help and was diagnosed with CVA.       Medications  Medications Prior to Admission   Medication Sig Dispense Refill Last Dose    amLODIPine (Norvasc) 5 mg tablet Take 1 tablet (5 mg) by mouth once daily.   4/10/2024    B complex-vitamin C-folic acid (Nephro-Daniel) 0.8 mg tablet Take 1 tablet by mouth once daily.   4/10/2024    gabapentin (Neurontin) 300 mg capsule Take 1 capsule (300 mg) by mouth 3 times a day for 3 days. 9 capsule 0 4/10/2024    hydrOXYzine HCL (Atarax) 10 mg tablet Take 1 tablet (10 mg) by mouth once daily.   4/10/2024    liraglutide (Victoza 2-Mark) 0.6 mg/0.1 mL (18 mg/3 mL) injection Inject 0.3 mL (1.8 mg) under the skin once daily.   4/10/2024    methocarbamol (Robaxin) 500 mg tablet Take 1 tablet (500 mg) by mouth 2 times a day.   4/10/2024    nystatin (Mycostatin) 100,000 unit/gram powder Apply 1 Application topically 3 times a day.   4/10/2024    omeprazole (PriLOSEC) 40 mg DR capsule Take 1 capsule (40 mg) by mouth once daily in the morning. Take before meals. Do not crush or chew.   4/10/2024    SITagliptin phosphate (Januvia) 25 mg tablet Take 1 tablet (25 mg) by mouth once daily.   4/10/2024    sucroferric oxyhydroxide (Velphoro) 500 mg tablet,chewable chewable tablet Chew 3 tablets (1,500 mg) 3 times a day with meals.   4/10/2024    torsemide (Demadex) 100 mg tablet Take 1 tablet (100 mg) by mouth once daily.   4/10/2024    ammonium lactate (Lac-Hydrin) 12 % lotion Apply  "topically if needed for dry skin.       hydrALAZINE (Apresoline) 25 mg tablet Take 1 tablet (25 mg) by mouth if needed.       insulin glargine (Lantus U-100 Insulin) 100 unit/mL injection Inject 15 Units under the skin once daily at bedtime. Take as directed per insulin instructions.       insulin lispro (HumaLOG) 100 unit/mL injection Inject under the skin 3 times a day with meals. Take as directed per insulin instructions.       isosorbide mononitrate ER (Imdur) 30 mg 24 hr tablet Take 1 tablet (30 mg) by mouth once daily. Do not crush or chew.       lidocaine (Lidoderm) 5 % patch Place 1 patch over 12 hours on the skin once daily for 14 days. Remove & discard patch within 12 hours or as directed by MD. 14 patch 0     lidocaine-prilocaine (Emla) cream Apply topically if needed for mild pain (1 - 3).       tiZANidine (Zanaflex) 4 mg tablet Take 1 tablet (4 mg) by mouth every 6 hours if needed for muscle spasms for up to 10 days. 30 tablet 0        Review of Systems  14 point review of systems was reviewed and negative except as noted above.    Objective     Last Recorded Vitals  Blood pressure 112/60, pulse 78, temperature 36.6 °C (97.9 °F), temperature source Oral, resp. rate 18, height 1.702 m (5' 7\"), weight 140 kg (308 lb 6.8 oz), SpO2 99%.       Neurological Exam  Physical Exam  Mental Status : More alert and interactive today  Alert , O x 2  Attention and concentration normal        Speech : Clear , fluent  No aphasia or dysarthria noted     CN 2-12 :  Pupils round , regular reacting to light  Fundus could not be assessed  VA intact, EOM intact  Facial sensation intact  Mild right facial asymmetry noted  Hearing acuity intact bilaterally  Tongue midline  Shoulder shrug intact     Motor:  Strength moves all extremities against gravity  Normal bulk and tone     Sensory:  Intact to light touch     Reflexes : 1+  symmetric with equivocal toes     Coordination : Intact to Finger to Nose     Gait : Unable to " assess.       Relevant Results  MR brain wo IV contrast    Result Date: 4/12/2024  Interpreted By:  Jose Conti, STUDY: MR BRAIN WO IV CONTRAST;  4/12/2024 12:12 pm   INDICATION: Signs/Symptoms:Patient with recent CVA with dizziness.   COMPARISON: CT head dated 04/11/2024.   ACCESSION NUMBER(S): [id number]   ORDERING CLINICIAN: JASON RASMUSSEN   TECHNIQUE: Standard multiplanar multisequence MR imaging was performed through the brain without intravenous contrast. Axial T2, FLAIR, DWI, gradient echo T2 and sagittal and coronal T1 weighted images of brain were acquired.  Motion artifact degrades assessment.   FINDINGS: Parenchyma: There are small DWI hyperintense foci involving the region of the left anterior genu of the corpus callosum and left frontal lobe white matter, appearing relatively normalized on ADC imaging, with corresponding T2/FLAIR hyperintensity, likely representing subacute ischemic insults. Additionally, there is a small focus of signal abnormality within the right periatrial white matter (series 502, image 22) as well as small foci within the mesial right temporal lobe and right midbrain cerebral peduncle (image 19) with similar imaging features. Attenuated focus of DWI hyperintensity within the right caudate head as well. No evidence of recent hemorrhage. There is no mass effect or midline shift. Otherwise trace chronic small vessel ischemic disease.   CSF Spaces: The ventricles, sulci and basal cisterns are within normal limits for age. Basilar cisterns are patent.   Extra-axial spaces: No extra-axial fluid collection.   Paranasal Sinuses: Mild ethmoidal mucosal sinus thickening. Otherwise the visualized paranasal sinuses are clear.   Mastoids: Clear.   Orbits: Bilateral native lens extractions. Slight tortuosity of the optic nerve sheaths bilaterally.   Calvarium: No suspicious osseous marrow signal.       Multiple small foci of diffusion restriction signal abnormality suggestive of  subacute ischemic insults involving the left frontal lobe and anterior corpus callosal region, right periatrial white matter, right mesial temporal lobe, and right midbrain cerebral peduncle. Additional attenuated focus within the right caudate head. No mass effect or hemorrhagic transformation. Otherwise trace chronic small vessel ischemic disease.   MACRO: None   Signed by: Jose Conti 4/12/2024 3:14 PM Dictation workstation:   [id number]    CT head wo IV contrast    Result Date: 4/11/2024  Interpreted By:  Stanford Schulz, STUDY: CT HEAD WO IV CONTRAST;  4/11/2024 9:35 am   INDICATION: Signs/Symptoms:weakness.   COMPARISON: Head CT 03/29/2024.   ACCESSION NUMBER(S): [id number]   ORDERING CLINICIAN: CALLY ONEAL   TECHNIQUE: Noncontrast volumetric CT scan of head and facial bones was performed. Angled reformats in brain and bone windows were generated. The images were reviewed in bone, brain, blood and soft tissue windows. Three-dimensional reformations were processed of the facial bones on a separate workstation.   FINDINGS: CSF Spaces: The ventricles, sulci and basal cisterns are within normal limits. There is no extraaxial fluid collection.   Parenchyma:  The grey-white differentiation is intact. There is no mass effect or midline shift.  There is no intracranial hemorrhage.   Calvarium: No acute displaced calvarial fracture. Mild hyperostosis frontalis interna.   Facial bones, paranasal sinuses and mastoids: Minimal fluid level or dependent opacification of the right posterior ethmoid sinus, incompletely assessed. Imaged mastoid air cells appear clear.       No CT evidence of acute intracranial pathology.     MACRO: None   Signed by: Stanford Schulz 4/11/2024 10:22 AM Dictation workstation:   [id number]    XR chest 1 view    Result Date: 4/11/2024  Interpreted By:  Leonard Kemp, STUDY: XR CHEST 1 VIEW;  4/11/2024 9:31 am   INDICATION: Signs/Symptoms:weakness.   COMPARISON: Chest CT 03/29/2024    ACCESSION NUMBER(S): [id number]   ORDERING CLINICIAN: CALLY ONEAL   FINDINGS: Two-view chest   DEVICES: Right central venous catheter.   CARDIOMEDIASTINAL SILHOUETTE: Cardiomediastinal silhouette is stable in size and configuration.No significant atherosclerotic calcification.   LUNGS: Bilateral interstitial thickening, concerning for vascular congestion. No pneumothorax. No pleural effusion.   BONES: No acute osseous changes.       1.  Findings suggestive of vascular congestion.     Signed by: Leonard Kemp 4/11/2024 10:05 AM Dictation workstation:   [id number]    ECG 12 Lead    Result Date: 4/11/2024  Normal sinus rhythm Septal infarct (cited on or before 08-DEC-2014) Abnormal ECG When compared with ECG of 26-SEP-2015 16:58, Vent. rate has decreased BY  45 BPM Questionable change in QRS axis     Results for orders placed or performed during the hospital encounter of 04/11/24 (from the past 24 hour(s))   POCT GLUCOSE   Result Value Ref Range    POCT Glucose 164 (H) 74 - 99 mg/dL   POCT GLUCOSE   Result Value Ref Range    POCT Glucose 194 (H) 74 - 99 mg/dL   Iron and TIBC   Result Value Ref Range    Iron 93 30 - 160 ug/dL    UIBC 49 (L) 110 - 370 ug/dL    TIBC 142 (L) 228 - 428 ug/dL    % Saturation 65 (H) 12 - 50 %   POCT GLUCOSE   Result Value Ref Range    POCT Glucose 136 (H) 74 - 99 mg/dL   POCT GLUCOSE   Result Value Ref Range    POCT Glucose 179 (H) 74 - 99 mg/dL       Assessment/Plan      Principal Problem:    Dizziness    Patient is a 58-year-old female with known history of hypertension, diabetes, chronic kidney disease on hemodialysis was admitted for CVA this week and discharged from an outside facility on aspirin and unable to take statin due to allergy, admitted with dizziness upon awakening with lightheadedness, rule out orthostatic hypotension contributing to her symptoms and with repeat MRI consistent with multiple diffusion-weighted defects noted and will also need to rule out sepsis,  metabolic and other causes.        Recommendations;  Reviewed CT scan brain and MRI of the brain results and discussed with the patient  Reviewed her previous records and imaging films from her recent discharge  Continue DAPT therapy with Aspirin / Plavix X 21 days and then asa 81 mg alone  Patient unable to take statin due to allergies  Cardiology consultation  Nephrology on board  PT/OT eval and treatment  Speech eval and treatment  IV fluids  Orthostatic blood pressure checks  Neurochecks  Permissive Blood pressure as per stroke protocol  Blood pressure / Blood sugar monitoring and control  Sepsis workup  Telemetry monitoring   Continue current management  Discussed the above plan with the patient     I spent 45 minutes in the professional and overall care of this patient.      Reagan Murray MD

## 2024-04-12 NOTE — PROGRESS NOTES
Physical Therapy                 Therapy Communication Note    Patient Name: Trisha Jackson  MRN: [id number]  Today's Date: 4/12/2024     Discipline: Physical Therapy    Missed Visit Reason: Missed Visit Reason: Patient in a medical procedure (pt currently off the floor for hemodialysis)    Missed Time: Cancel

## 2024-04-12 NOTE — NURSING NOTE
Pt has a R chest dialysis catheter, drsg dry and intact (dated 4/7) without any redness, swelling or drainage.

## 2024-04-12 NOTE — NURSING NOTE
LPN received bedside shift report, pt awake in bed, call light in reach. Pt experiencing pain in her arms and hands, message sent to Dr. Chandler from previous RN Bridget. Pt still has not urinated or had BM. LPN will send message to Dr. Shaikh.

## 2024-04-12 NOTE — CONSULTS
Inpatient consult to Gastroenterology  Consult performed by: Sindi Jacinto, SABRINA-CNP  Consult ordered by: Jena Chandler MD          Reason For Consult    Dark stool    History Of Present Illness  Trisha Jackson is a 58 y.o. female with a past medical history of end-stage kidney disease, on dialysis, diabetes mellitus type 2, hypertension, hyperlipidemia, and morbid obesity. She presented to ED with generalized weakness and dizziness. Patient was recently discharged on Tuesday from [facility] for acute CVA, was not a candidate for TPA. GI consulted for dark stools. Patient reports she always has dark stools due to her sucroferric oxyhydroxide. She reports they are not black, but are very dark. Patient is only on ASA, no other blood thinners. Denies any NSAID use. She states she had EGD last year at [facility] and was normal. No prior colonoscopy. H/H stable at 9.4/29.1. She denies any current abdominal pain, nausea, vomiting.      Past Medical History  She has a past medical history of Personal history of other diseases of the nervous system and sense organs (2014).    Surgical History  She has a past surgical history that includes Total vaginal hysterectomy (2013);  section, classic (2013); Hysterectomy (2013); and Total knee arthroplasty (2015).     Social History  She has no history on file for tobacco use, alcohol use, and drug use.    Family History  No family history on file.     Allergies  Egg and Statins-hmg-coa reductase inhibitors    Review of Systems   Constitutional: Negative.    HENT: Negative.     Eyes: Negative.    Respiratory: Negative.     Cardiovascular: Negative.    Gastrointestinal: Negative.    Endocrine: Negative.    Genitourinary: Negative.    Musculoskeletal: Negative.    Skin: Negative.    Allergic/Immunologic: Negative.    Neurological: Negative.    Hematological: Negative.    Psychiatric/Behavioral: Negative.          Physical Exam  HENT:       "Head: Normocephalic.      Nose: Nose normal.      Mouth/Throat:      Mouth: Mucous membranes are moist.   Eyes:      Pupils: Pupils are equal, round, and reactive to light.   Cardiovascular:      Rate and Rhythm: Normal rate.   Pulmonary:      Effort: Pulmonary effort is normal.   Abdominal:      Palpations: Abdomen is soft.   Musculoskeletal:         General: Normal range of motion.      Cervical back: Normal range of motion.   Skin:     General: Skin is warm.   Neurological:      General: No focal deficit present.      Mental Status: She is alert.   Psychiatric:         Mood and Affect: Mood normal.          Last Recorded Vitals  Blood pressure 112/60, pulse 78, temperature 36.6 °C (97.9 °F), temperature source Oral, resp. rate 18, height 1.702 m (5' 7\"), weight 140 kg (308 lb 6.8 oz), SpO2 99%.    Relevant Results  CT head wo IV contrast    Result Date: 4/11/2024  Interpreted By:  Stanford Schulz, STUDY: CT HEAD WO IV CONTRAST;  4/11/2024 9:35 am   INDICATION: Signs/Symptoms:weakness.   COMPARISON: Head CT 03/29/2024.   ACCESSION NUMBER(S): [id number]   ORDERING CLINICIAN: CALLY ONEAL   TECHNIQUE: Noncontrast volumetric CT scan of head and facial bones was performed. Angled reformats in brain and bone windows were generated. The images were reviewed in bone, brain, blood and soft tissue windows. Three-dimensional reformations were processed of the facial bones on a separate workstation.   FINDINGS: CSF Spaces: The ventricles, sulci and basal cisterns are within normal limits. There is no extraaxial fluid collection.   Parenchyma:  The grey-white differentiation is intact. There is no mass effect or midline shift.  There is no intracranial hemorrhage.   Calvarium: No acute displaced calvarial fracture. Mild hyperostosis frontalis interna.   Facial bones, paranasal sinuses and mastoids: Minimal fluid level or dependent opacification of the right posterior ethmoid sinus, incompletely assessed. Imaged mastoid air " cells appear clear.       No CT evidence of acute intracranial pathology.     MACRO: None   Signed by: Stanford Schulz 4/11/2024 10:22 AM Dictation workstation:   [id number]    XR chest 1 view    Result Date: 4/11/2024  Interpreted By:  Leonard Kemp, STUDY: XR CHEST 1 VIEW;  4/11/2024 9:31 am   INDICATION: Signs/Symptoms:weakness.   COMPARISON: Chest CT 03/29/2024   ACCESSION NUMBER(S): [id number]   ORDERING CLINICIAN: CALLY ONEAL   FINDINGS: Two-view chest   DEVICES: Right central venous catheter.   CARDIOMEDIASTINAL SILHOUETTE: Cardiomediastinal silhouette is stable in size and configuration.No significant atherosclerotic calcification.   LUNGS: Bilateral interstitial thickening, concerning for vascular congestion. No pneumothorax. No pleural effusion.   BONES: No acute osseous changes.       1.  Findings suggestive of vascular congestion.     Signed by: Leonard Kemp 4/11/2024 10:05 AM Dictation workstation:   [id number]    ECG 12 Lead    Result Date: 4/11/2024  Normal sinus rhythm Septal infarct (cited on or before 08-DEC-2014) Abnormal ECG When compared with ECG of 26-SEP-2015 16:58, Vent. rate has decreased BY  45 BPM Questionable change in QRS axis    Scheduled medications  amLODIPine, 5 mg, oral, Daily  B complex-vitamin C-folic acid, 1 capsule, oral, Daily  epoetin raf or biosimilar, 5,000 Units, subcutaneous, Once per day on Monday Wednesday Friday  hydrALAZINE, 25 mg, oral, BID  insulin glargine, 15 Units, subcutaneous, Nightly  insulin lispro, 0-5 Units, subcutaneous, TID with meals  isosorbide mononitrate ER, 30 mg, oral, Daily  nystatin, 1 Application, Topical, TID  pantoprazole, 40 mg, oral, Daily before breakfast  SITagliptin phosphate, 25 mg, oral, Daily      Continuous medications     PRN medications  PRN medications: acetaminophen, dextrose, dextrose, glucagon, glucagon, hydrOXYzine HCL  Results for orders placed or performed during the hospital encounter of 04/11/24 (from the past 24  hour(s))   POCT GLUCOSE   Result Value Ref Range    POCT Glucose 164 (H) 74 - 99 mg/dL   POCT GLUCOSE   Result Value Ref Range    POCT Glucose 194 (H) 74 - 99 mg/dL   Iron and TIBC   Result Value Ref Range    Iron 93 30 - 160 ug/dL    UIBC 49 (L) 110 - 370 ug/dL    TIBC 142 (L) 228 - 428 ug/dL    % Saturation 65 (H) 12 - 50 %   POCT GLUCOSE   Result Value Ref Range    POCT Glucose 136 (H) 74 - 99 mg/dL   POCT GLUCOSE   Result Value Ref Range    POCT Glucose 179 (H) 74 - 99 mg/dL        Assessment/Plan     Melena/Anemia of chronic kidney disease (normocytic anemia Hgb 9.4)   Reported dark stools, however reports this is normal with her sucroferric oxyhydroxide. No BM today   No obvious signs of bleeding   No urgent indication for endoscopies at this point  Monitor H/H  Monitor stools, please guaiac stool  Continue PPI   Recommended patient have Colonoscopy as she has never had one, may be done as outpatient.     Sindi Jacinto, APRN-CNP

## 2024-04-12 NOTE — CONSULTS
.Reason For Consult  End-stage kidney disease and dialysis therapy with mild hyperkalemia    History Of Present Illness  Trisha Jackson is a 58 y.o. female known to have underlying end-stage kidney disease she is on dialysis  she is under the care of [facility] apparently the patient also known to have history of diabetes mellitus type 2, hypertension, hyperlipidemia, morbid obesity, was released from [facility] last Tuesday after she had suffered a stroke she was stable and she was discharged home she came into the emergency room here at [facility], [place] because of generalized weakness not feeling well with some weakness more on the right side of her body comparing to the left side also complaining of dizziness but she had no speech abnormalities no headaches no blurred vision MRI of the brain did show multiple small strokes no cerebral edema I was asked to see the patient for her end-stage kidney disease and dialysis therapy.     Review of Systems  10 point review of systems was done all negative except was positive for the history of present illness    Past Medical History  She has a past medical history of Personal history of other diseases of the nervous system and sense organs (2014).    Surgical History  She has a past surgical history that includes Total vaginal hysterectomy (2013);  section, classic (2013); Hysterectomy (2013); and Total knee arthroplasty (2015).     Social History  She has no history on file for tobacco use, alcohol use, and drug use.    Family History  No family history on file.     Current Facility-Administered Medications:     acetaminophen (Tylenol) tablet 650 mg, 650 mg, oral, q6h PRN, Jena Chandler MD, 650 mg at 24 0831    amLODIPine (Norvasc) tablet 5 mg, 5 mg, oral, Daily, Jena Chandler MD, 5 mg at 24 0832    dextrose 50 % injection 12.5 g, 12.5 g, intravenous, q15 min PRN, Jena Chandler  MD    dextrose 50 % injection 25 g, 25 g, intravenous, q15 min PRN, Jena Chandler MD    glucagon (Glucagen) injection 1 mg, 1 mg, intramuscular, q15 min PRN, Jena Chandler MD    glucagon (Glucagen) injection 1 mg, 1 mg, intramuscular, q15 min PRN, Jena Chandler MD    hydrALAZINE (Apresoline) tablet 25 mg, 25 mg, oral, BID, Jena Chandler MD, 25 mg at 04/12/24 0832    hydrOXYzine HCL (Atarax) tablet 10 mg, 10 mg, oral, q6h PRN, Jena Chandler MD    insulin glargine (Lantus) injection 15 Units, 15 Units, subcutaneous, Nightly, Jena Chandler MD, 15 Units at 04/11/24 2100    insulin lispro (HumaLOG) injection 0-5 Units, 0-5 Units, subcutaneous, TID with meals, Jena Chandler MD, 1 Units at 04/11/24 1858    isosorbide mononitrate ER (Imdur) 24 hr tablet 30 mg, 30 mg, oral, Daily, Jena Chandler MD, 30 mg at 04/12/24 0832    nystatin (Mycostatin) 100,000 unit/gram powder 1 Application, 1 Application, Topical, TID, Jena Chandler MD, 1 Application at 04/12/24 0900    pantoprazole (ProtoNix) EC tablet 40 mg, 40 mg, oral, Daily before breakfast, Jena Chandler MD, 40 mg at 04/12/24 0706    SITagliptin phosphate (Januvia) tablet 25 mg, 25 mg, oral, Daily, Jena Chandler MD, 25 mg at 04/12/24 0832   Allergies  Egg and Statins-hmg-coa reductase inhibitors         Physical Exam  Physical Exam  Constitutional:       General: She is not in acute distress.     Appearance: She is not toxic-appearing.   HENT:      Head: Normocephalic and atraumatic.   Eyes:      Extraocular Movements: Extraocular movements intact.      Pupils: Pupils are equal, round, and reactive to light.   Neck:      Vascular: No carotid bruit.   Cardiovascular:      Rate and Rhythm: Normal rate and regular rhythm.   Pulmonary:      Effort: No respiratory distress.      Breath sounds: No stridor. No wheezing, rhonchi or rales.   Chest:      Chest wall: No tenderness.   Abdominal:      General: There is no distension.      Palpations:  There is no mass.      Tenderness: There is no abdominal tenderness. There is no right CVA tenderness, left CVA tenderness or guarding.      Hernia: No hernia is present.   Musculoskeletal:         General: No swelling or tenderness.      Cervical back: No rigidity.      Right lower leg: No edema.      Left lower leg: No edema.   Lymphadenopathy:      Cervical: No cervical adenopathy.   Skin:     General: Skin is warm and dry.      Coloration: Skin is not jaundiced or pale.      Findings: No bruising or erythema.   Neurological:      Mental Status: She is alert and oriented to person, place, and time.      Comments: Very mild weakness on the right side              I&O 24HR    Intake/Output Summary (Last 24 hours) at 4/12/2024 1105  Last data filed at 4/11/2024 1817  Gross per 24 hour   Intake 300 ml   Output --   Net 300 ml       Vitals 24HR  Heart Rate:  [77-84]   Temp:  [36.3 °C (97.3 °F)-36.6 °C (97.9 °F)]   Resp:  [16-20]   BP: (116-148)/(46-67)   SpO2:  [93 %-100 %]     Relevant Results        Results for orders placed or performed during the hospital encounter of 04/11/24 (from the past 96 hour(s))   Comprehensive Metabolic Panel   Result Value Ref Range    Glucose 220 (H) 65 - 99 mg/dL    Sodium 136 133 - 145 mmol/L    Potassium 5.3 (H) 3.4 - 5.1 mmol/L    Chloride 98 97 - 107 mmol/L    Bicarbonate 24 24 - 31 mmol/L    Urea Nitrogen 80 (H) 8 - 25 mg/dL    Creatinine 10.50 (H) 0.40 - 1.60 mg/dL    eGFR 4 (L) >60 mL/min/1.73m*2    Calcium 9.8 8.5 - 10.4 mg/dL    Albumin 3.6 3.5 - 5.0 g/dL    Alkaline Phosphatase 171 (H) 35 - 125 U/L    Total Protein 6.9 5.9 - 7.9 g/dL    AST 19 5 - 40 U/L    Bilirubin, Total <0.2 0.1 - 1.2 mg/dL    ALT 20 5 - 40 U/L    Anion Gap 14 <=19 mmol/L   CBC and Auto Differential   Result Value Ref Range    WBC 5.9 4.4 - 11.3 x10*3/uL    nRBC 0.0 0.0 - 0.0 /100 WBCs    RBC 3.16 (L) 4.00 - 5.20 x10*6/uL    Hemoglobin 9.4 (L) 12.0 - 16.0 g/dL    Hematocrit 29.1 (L) 36.0 - 46.0 %    MCV  92 80 - 100 fL    MCH 29.7 26.0 - 34.0 pg    MCHC 32.3 32.0 - 36.0 g/dL    RDW 14.6 (H) 11.5 - 14.5 %    Platelets 177 150 - 450 x10*3/uL    Neutrophils % 57.5 40.0 - 80.0 %    Immature Granulocytes %, Automated 0.3 0.0 - 0.9 %    Lymphocytes % 30.1 13.0 - 44.0 %    Monocytes % 9.0 2.0 - 10.0 %    Eosinophils % 2.4 0.0 - 6.0 %    Basophils % 0.7 0.0 - 2.0 %    Neutrophils Absolute 3.39 1.20 - 7.70 x10*3/uL    Immature Granulocytes Absolute, Automated 0.02 0.00 - 0.70 x10*3/uL    Lymphocytes Absolute 1.77 1.20 - 4.80 x10*3/uL    Monocytes Absolute 0.53 0.10 - 1.00 x10*3/uL    Eosinophils Absolute 0.14 0.00 - 0.70 x10*3/uL    Basophils Absolute 0.04 0.00 - 0.10 x10*3/uL   Serial Troponin, Initial ([facility])   Result Value Ref Range    Troponin T, High Sensitivity 360 (HH) <=14 ng/L   ECG 12 Lead   Result Value Ref Range    Ventricular Rate 79 BPM    Atrial Rate 79 BPM    ND Interval 174 ms    QRS Duration 102 ms    QT Interval 404 ms    QTC Calculation(Bazett) 463 ms    P Axis 65 degrees    R Axis 83 degrees    T Axis 47 degrees    QRS Count 13 beats    Q Onset 217 ms    P Onset 130 ms    P Offset 188 ms    T Offset 419 ms    QTC Fredericia 443 ms   Serial Troponin, 2 Hour ([facility])   Result Value Ref Range    Troponin T, High Sensitivity 365 (HH) <=14 ng/L   Blood Culture    Specimen: Peripheral Venipuncture; Blood culture   Result Value Ref Range    Blood Culture Loaded on Instrument - Culture in progress    Serial Troponin, 6 Hour ([facility])   Result Value Ref Range    Troponin T, High Sensitivity 359 (HH) <=14 ng/L   POCT GLUCOSE   Result Value Ref Range    POCT Glucose 164 (H) 74 - 99 mg/dL   POCT GLUCOSE   Result Value Ref Range    POCT Glucose 194 (H) 74 - 99 mg/dL   Iron and TIBC   Result Value Ref Range    Iron 93 30 - 160 ug/dL    UIBC 49 (L) 110 - 370 ug/dL    TIBC 142 (L) 228 - 428 ug/dL    % Saturation 65 (H) 12 - 50 %   POCT GLUCOSE   Result Value Ref Range    POCT Glucose 136 (H) 74 - 99 mg/dL           Assessment/Plan     CT head wo IV contrast    Result Date: 4/11/2024  Interpreted By:  Stanford Schulz, STUDY: CT HEAD WO IV CONTRAST;  4/11/2024 9:35 am   INDICATION: Signs/Symptoms:weakness.   COMPARISON: Head CT 03/29/2024.   ACCESSION NUMBER(S): [id number]   ORDERING CLINICIAN: CALLY ONEAL   TECHNIQUE: Noncontrast volumetric CT scan of head and facial bones was performed. Angled reformats in brain and bone windows were generated. The images were reviewed in bone, brain, blood and soft tissue windows. Three-dimensional reformations were processed of the facial bones on a separate workstation.   FINDINGS: CSF Spaces: The ventricles, sulci and basal cisterns are within normal limits. There is no extraaxial fluid collection.   Parenchyma:  The grey-white differentiation is intact. There is no mass effect or midline shift.  There is no intracranial hemorrhage.   Calvarium: No acute displaced calvarial fracture. Mild hyperostosis frontalis interna.   Facial bones, paranasal sinuses and mastoids: Minimal fluid level or dependent opacification of the right posterior ethmoid sinus, incompletely assessed. Imaged mastoid air cells appear clear.       No CT evidence of acute intracranial pathology.     MACRO: None   Signed by: Stanford Schulz 4/11/2024 10:22 AM Dictation workstation:   [id number]    XR chest 1 view    Result Date: 4/11/2024  Interpreted By:  Leonard Kemp, STUDY: XR CHEST 1 VIEW;  4/11/2024 9:31 am   INDICATION: Signs/Symptoms:weakness.   COMPARISON: Chest CT 03/29/2024   ACCESSION NUMBER(S): [id number]   ORDERING CLINICIAN: CALLY ONEAL   FINDINGS: Two-view chest   DEVICES: Right central venous catheter.   CARDIOMEDIASTINAL SILHOUETTE: Cardiomediastinal silhouette is stable in size and configuration.No significant atherosclerotic calcification.   LUNGS: Bilateral interstitial thickening, concerning for vascular congestion. No pneumothorax. No pleural effusion.   BONES: No acute osseous changes.        1.  Findings suggestive of vascular congestion.     Signed by: Leonard Kemp 4/11/2024 10:05 AM Dictation workstation:   [id number]    ECG 12 Lead    Result Date: 4/11/2024  Normal sinus rhythm Septal infarct (cited on or before 08-DEC-2014) Abnormal ECG When compared with ECG of 26-SEP-2015 16:58, Vent. rate has decreased BY  45 BPM Questionable change in QRS axis    Impression:  End-stage kidney disease on dialysis Monday Wednesday Friday  Recent stroke  Hypertension  Diabetes mellitus type 2  Anemia of chronic kidney disease  Hyperlipidemia  Obesity  Mild hyperkalemia  Mild fluid overload    Recommendations:  I will schedule for dialysis today  Erythropoietin for her anemia  Resume home medications  Renal vitamins  Renal diet  Neurology evaluation  Blood cultures x 2      Thank you for your consultation            Principal Problem:    Dizziness        I spent 35 minutes in the professional and overall care of this patient.      Matheus Shaikh MDInpatient consult to Nephrology  Consult performed by: Matheus Shaikh MD  Consult ordered by: Jena Chandler MD

## 2024-04-12 NOTE — NURSING NOTE
Pt ambulated with nurse to restroom with nurse. Pt had the smallest amount of urine and when she had a BM she missed the hat. Pt's BM was black, tarry, medium size and watery. Pt states the black stool is normal with kidney medication. LPN tried to collect stool sample and urine sample, was unable to collect either.

## 2024-04-12 NOTE — PRE-PROCEDURE NOTE
Report from Sending RN:    Report From: GRAZYNA  Recent Surgery of Procedure: No MRI  Baseline Level of Consciousness (LOC): 4  Oxygen Use: No  Type: ROOM AIR   Diabetic: Yes 136  Last BP Med Given Day of Dialysis: MAR   Last Pain Med Given: MAR  Lab Tests to be Obtained with Dialysis: No  Blood Transfusion to be Given During Dialysis: No  Available IV Access: Yes  Medications to be Administered During Dialysis: No  Continuous IV Infusion Running: No  Restraints on Currently or in the Last 24 Hours: No  Hand-Off Communication: CAN COME TO HD ROOM   Dialysis Catheter Dressing: WILL ACCESS   Last Dressing Change WILL ACCESS

## 2024-04-12 NOTE — NURSING NOTE
Pt back from dialysis. 2ls removed. Pt had to use rest room, LPN was able to collect urine sample and stool sample. LPN sent both samples to lab.    LPN received call from lab stating the specimens weren't able to be ran through due to the labels being in the bag and not on the actual specimen. Urine sample and stool sample need to still be collected.

## 2024-04-12 NOTE — PROGRESS NOTES
04/12/24 1812   Physical Activity   On average, how many minutes do you engage in exercise at this level? 0 min   Financial Resource Strain   How hard is it for you to pay for the very basics like food, housing, medical care, and heating? Not very   Housing Stability   In the last 12 months, was there a time when you were not able to pay the mortgage or rent on time? N   In the last 12 months, was there a time when you did not have a steady place to sleep or slept in a shelter (including now)? N   Transportation Needs   In the past 12 months, has lack of transportation kept you from medical appointments or from getting medications? no   In the past 12 months, has lack of transportation kept you from meetings, work, or from getting things needed for daily living? No   Food Insecurity   Within the past 12 months, you worried that your food would run out before you got the money to buy more. Never true   Within the past 12 months, the food you bought just didn't last and you didn't have money to get more. Never true   Stress   Do you feel stress - tense, restless, nervous, or anxious, or unable to sleep at night because your mind is troubled all the time - these days? Only a littl   Social Connections   In a typical week, how many times do you talk on the phone with family, friends, or neighbors? More than 3   How often do you get together with friends or relatives? More than 3   Do you belong to any clubs or organizations such as Mosque groups, unions, fraternal or athletic groups, or school groups? No   How often do you attend meetings of the clubs or organizations you belong to? Never   Are you , , , , never , or living with a partner? Never marrie   Intimate Partner Violence   Within the last year, have you been afraid of your partner or ex-partner? No   Within the last year, have you been humiliated or emotionally abused in other ways by your partner or ex-partner? No    Within the last year, have you been kicked, hit, slapped, or otherwise physically hurt by your partner or ex-partner? No   Within the last year, have you been raped or forced to have any kind of sexual activity by your partner or ex-partner? No   Alcohol Use   Q1: How often do you have a drink containing alcohol? Never   Q2: How many drinks containing alcohol do you have on a typical day when you are drinking? None   Q3: How often do you have six or more drinks on one occasion? Never   Utilities   In the past 12 months has the electric, gas, oil, or water company threatened to shut off services in your home? No   Health Literacy   How often do you need to have someone help you when you read instructions, pamphlets, or other written material from your doctor or pharmacy? Never     Met with patient at bedside. An explanation of discharge planning was provided.  Assessment completed.  Patient resides in an apartment with her son.  Thee are two steps to enter the apartment.  Patient  uses a walker in the apartment.  Uses a cane when outside. Patient is able to cook, clean, shop and drive.  Patient does not require oxygen.  Patient receives dialysis treatments M-W-F at [facility] in [place]. She uses a transport van through her insurance for transportation to dialysis.  Patient is diabetic. She has a glucometer in the home and checks blood glucose 4 times daily. Patient's sister and brother also offer help and support in the apartment. Patient declines homegoing needs.  Son will provided transportation home when ready for discharge.

## 2024-04-13 LAB
ALBUMIN SERPL-MCNC: 3.6 G/DL (ref 3.5–5)
AMPHETAMINES UR QL SCN>1000 NG/ML: NEGATIVE
ANION GAP SERPL CALC-SCNC: 16 MMOL/L
APPEARANCE UR: ABNORMAL
ATRIAL RATE: 79 BPM
BACTERIA #/AREA URNS AUTO: ABNORMAL /HPF
BARBITURATES UR QL SCN>300 NG/ML: NEGATIVE
BENZODIAZ UR QL SCN>300 NG/ML: NEGATIVE
BILIRUB UR STRIP.AUTO-MCNC: NEGATIVE MG/DL
BUN SERPL-MCNC: 52 MG/DL (ref 8–25)
BZE UR QL SCN>300 NG/ML: NEGATIVE
CALCIUM SERPL-MCNC: 8.8 MG/DL (ref 8.5–10.4)
CANNABINOIDS UR QL SCN>50 NG/ML: NEGATIVE
CHLORIDE SERPL-SCNC: 94 MMOL/L (ref 97–107)
CO2 SERPL-SCNC: 21 MMOL/L (ref 24–31)
COLOR UR: ABNORMAL
CREAT SERPL-MCNC: 8.2 MG/DL (ref 0.4–1.6)
EGFRCR SERPLBLD CKD-EPI 2021: 5 ML/MIN/1.73M*2
ERYTHROCYTE [DISTWIDTH] IN BLOOD BY AUTOMATED COUNT: 14.3 % (ref 11.5–14.5)
FENTANYL+NORFENTANYL UR QL SCN: NEGATIVE
GLUCOSE BLD MANUAL STRIP-MCNC: 135 MG/DL (ref 74–99)
GLUCOSE BLD MANUAL STRIP-MCNC: 159 MG/DL (ref 74–99)
GLUCOSE BLD MANUAL STRIP-MCNC: 179 MG/DL (ref 74–99)
GLUCOSE BLD MANUAL STRIP-MCNC: 192 MG/DL (ref 74–99)
GLUCOSE SERPL-MCNC: 216 MG/DL (ref 65–99)
GLUCOSE UR STRIP.AUTO-MCNC: ABNORMAL MG/DL
HCT VFR BLD AUTO: 31.5 % (ref 36–46)
HGB BLD-MCNC: 10.8 G/DL (ref 12–16)
KETONES UR STRIP.AUTO-MCNC: NEGATIVE MG/DL
LEUKOCYTE ESTERASE UR QL STRIP.AUTO: ABNORMAL
MCH RBC QN AUTO: 29.8 PG (ref 26–34)
MCHC RBC AUTO-ENTMCNC: 34.3 G/DL (ref 32–36)
MCV RBC AUTO: 87 FL (ref 80–100)
METHADONE UR QL SCN>300 NG/ML: NEGATIVE
NITRITE UR QL STRIP.AUTO: NEGATIVE
NRBC BLD-RTO: 0 /100 WBCS (ref 0–0)
OPIATES UR QL SCN>300 NG/ML: NEGATIVE
OXYCODONE UR QL: NEGATIVE
P AXIS: 65 DEGREES
P OFFSET: 188 MS
P ONSET: 130 MS
PCP UR QL SCN>25 NG/ML: NEGATIVE
PH UR STRIP.AUTO: 7.5 [PH]
PHOSPHATE SERPL-MCNC: 4 MG/DL (ref 2.5–4.5)
PLATELET # BLD AUTO: 184 X10*3/UL (ref 150–450)
POTASSIUM SERPL-SCNC: 5.2 MMOL/L (ref 3.4–5.1)
PR INTERVAL: 174 MS
PROT UR STRIP.AUTO-MCNC: ABNORMAL MG/DL
Q ONSET: 217 MS
QRS COUNT: 13 BEATS
QRS DURATION: 102 MS
QT INTERVAL: 404 MS
QTC CALCULATION(BAZETT): 463 MS
QTC FREDERICIA: 443 MS
R AXIS: 83 DEGREES
RBC # BLD AUTO: 3.62 X10*6/UL (ref 4–5.2)
RBC # UR STRIP.AUTO: ABNORMAL /UL
RBC #/AREA URNS AUTO: ABNORMAL /HPF
SODIUM SERPL-SCNC: 131 MMOL/L (ref 133–145)
SP GR UR STRIP.AUTO: 1.01
SQUAMOUS #/AREA URNS AUTO: ABNORMAL /HPF
T AXIS: 47 DEGREES
T OFFSET: 419 MS
UROBILINOGEN UR STRIP.AUTO-MCNC: NORMAL MG/DL
VENTRICULAR RATE: 79 BPM
WBC # BLD AUTO: 6.8 X10*3/UL (ref 4.4–11.3)
WBC #/AREA URNS AUTO: >50 /HPF
WBC CLUMPS #/AREA URNS AUTO: ABNORMAL /HPF

## 2024-04-13 PROCEDURE — 97166 OT EVAL MOD COMPLEX 45 MIN: CPT | Mod: GO

## 2024-04-13 PROCEDURE — 2500000001 HC RX 250 WO HCPCS SELF ADMINISTERED DRUGS (ALT 637 FOR MEDICARE OP): Performed by: INTERNAL MEDICINE

## 2024-04-13 PROCEDURE — 1210000001 HC SEMI-PRIVATE ROOM DAILY

## 2024-04-13 PROCEDURE — 80069 RENAL FUNCTION PANEL: CPT | Performed by: INTERNAL MEDICINE

## 2024-04-13 PROCEDURE — 82947 ASSAY GLUCOSE BLOOD QUANT: CPT

## 2024-04-13 PROCEDURE — 97530 THERAPEUTIC ACTIVITIES: CPT | Mod: GO

## 2024-04-13 PROCEDURE — 85027 COMPLETE CBC AUTOMATED: CPT | Performed by: INTERNAL MEDICINE

## 2024-04-13 PROCEDURE — 97161 PT EVAL LOW COMPLEX 20 MIN: CPT | Mod: GP

## 2024-04-13 PROCEDURE — 2500000002 HC RX 250 W HCPCS SELF ADMINISTERED DRUGS (ALT 637 FOR MEDICARE OP, ALT 636 FOR OP/ED): Performed by: INTERNAL MEDICINE

## 2024-04-13 RX ADMIN — HYDRALAZINE HYDROCHLORIDE 25 MG: 25 TABLET ORAL at 09:26

## 2024-04-13 RX ADMIN — HYDRALAZINE HYDROCHLORIDE 25 MG: 25 TABLET ORAL at 20:42

## 2024-04-13 RX ADMIN — NYSTATIN 1 APPLICATION: 100000 POWDER TOPICAL at 15:00

## 2024-04-13 RX ADMIN — ISOSORBIDE MONONITRATE 30 MG: 30 TABLET, EXTENDED RELEASE ORAL at 09:26

## 2024-04-13 RX ADMIN — NYSTATIN 1 APPLICATION: 100000 POWDER TOPICAL at 20:42

## 2024-04-13 RX ADMIN — INSULIN LISPRO 1 UNITS: 100 INJECTION, SOLUTION INTRAVENOUS; SUBCUTANEOUS at 16:47

## 2024-04-13 RX ADMIN — PANTOPRAZOLE SODIUM 40 MG: 40 TABLET, DELAYED RELEASE ORAL at 06:20

## 2024-04-13 RX ADMIN — ASCORBIC ACID, THIAMINE MONONITRATE,RIBOFLAVIN, NIACINAMIDE, PYRIDOXINE HYDROCHLORIDE, FOLIC ACID, CYANOCOBALAMIN, BIOTIN, CALCIUM PANTOTHENATE, 1 CAPSULE: 100; 1.5; 1.7; 20; 10; 1; 6000; 150000; 5 CAPSULE, LIQUID FILLED ORAL at 09:26

## 2024-04-13 RX ADMIN — AMLODIPINE BESYLATE 5 MG: 5 TABLET ORAL at 09:26

## 2024-04-13 RX ADMIN — INSULIN GLARGINE 15 UNITS: 100 INJECTION, SOLUTION SUBCUTANEOUS at 20:42

## 2024-04-13 RX ADMIN — SITAGLIPTIN 25 MG: 50 TABLET, FILM COATED ORAL at 09:26

## 2024-04-13 RX ADMIN — ACETAMINOPHEN 650 MG: 325 TABLET ORAL at 03:19

## 2024-04-13 RX ADMIN — NYSTATIN 1 APPLICATION: 100000 POWDER TOPICAL at 09:28

## 2024-04-13 RX ADMIN — INSULIN LISPRO 1 UNITS: 100 INJECTION, SOLUTION INTRAVENOUS; SUBCUTANEOUS at 12:42

## 2024-04-13 SDOH — HEALTH STABILITY: PHYSICAL HEALTH: ON AVERAGE, HOW MANY MINUTES DO YOU ENGAGE IN EXERCISE AT THIS LEVEL?: 0 MIN

## 2024-04-13 SDOH — SOCIAL STABILITY: SOCIAL NETWORK: ARE YOU MARRIED, WIDOWED, DIVORCED, SEPARATED, NEVER MARRIED, OR LIVING WITH A PARTNER?: NEVER MARRIED

## 2024-04-13 SDOH — HEALTH STABILITY: MENTAL HEALTH: HOW OFTEN DO YOU HAVE SIX OR MORE DRINKS ON ONE OCCASION?: NEVER

## 2024-04-13 SDOH — SOCIAL STABILITY: SOCIAL INSECURITY: ARE THERE ANY APPARENT SIGNS OF INJURIES/BEHAVIORS THAT COULD BE RELATED TO ABUSE/NEGLECT?: NO

## 2024-04-13 SDOH — SOCIAL STABILITY: SOCIAL INSECURITY: ABUSE: ADULT

## 2024-04-13 SDOH — SOCIAL STABILITY: SOCIAL INSECURITY: ARE YOU OR HAVE YOU BEEN THREATENED OR ABUSED PHYSICALLY, EMOTIONALLY, OR SEXUALLY BY ANYONE?: NO

## 2024-04-13 SDOH — SOCIAL STABILITY: SOCIAL INSECURITY: HAVE YOU HAD THOUGHTS OF HARMING ANYONE ELSE?: NO

## 2024-04-13 SDOH — SOCIAL STABILITY: SOCIAL NETWORK: HOW OFTEN DO YOU ATTEND CHURCH OR RELIGIOUS SERVICES?: 1 TO 4 TIMES PER YEAR

## 2024-04-13 SDOH — ECONOMIC STABILITY: INCOME INSECURITY: IN THE PAST 12 MONTHS HAS THE ELECTRIC, GAS, OIL, OR WATER COMPANY THREATENED TO SHUT OFF SERVICES IN YOUR HOME?: NO

## 2024-04-13 SDOH — HEALTH STABILITY: MENTAL HEALTH: HOW OFTEN DO YOU HAVE A DRINK CONTAINING ALCOHOL?: NEVER

## 2024-04-13 SDOH — ECONOMIC STABILITY: FOOD INSECURITY: WITHIN THE PAST 12 MONTHS, THE FOOD YOU BOUGHT JUST DIDN'T LAST AND YOU DIDN'T HAVE MONEY TO GET MORE.: NEVER TRUE

## 2024-04-13 SDOH — SOCIAL STABILITY: SOCIAL INSECURITY: WITHIN THE LAST YEAR, HAVE YOU BEEN AFRAID OF YOUR PARTNER OR EX-PARTNER?: NO

## 2024-04-13 SDOH — HEALTH STABILITY: MENTAL HEALTH: HOW OFTEN DO YOU HAVE 6 OR MORE DRINKS ON ONE OCCASION?: NEVER

## 2024-04-13 SDOH — ECONOMIC STABILITY: FOOD INSECURITY: WITHIN THE PAST 12 MONTHS, YOU WORRIED THAT YOUR FOOD WOULD RUN OUT BEFORE YOU GOT MONEY TO BUY MORE.: NEVER TRUE

## 2024-04-13 SDOH — HEALTH STABILITY: MENTAL HEALTH: HOW MANY DRINKS CONTAINING ALCOHOL DO YOU HAVE ON A TYPICAL DAY WHEN YOU ARE DRINKING?: PATIENT DOES NOT DRINK

## 2024-04-13 SDOH — SOCIAL STABILITY: SOCIAL INSECURITY: WITHIN THE LAST YEAR, HAVE YOU BEEN HUMILIATED OR EMOTIONALLY ABUSED IN OTHER WAYS BY YOUR PARTNER OR EX-PARTNER?: NO

## 2024-04-13 SDOH — SOCIAL STABILITY: SOCIAL NETWORK: HOW OFTEN DO YOU GET TOGETHER WITH FRIENDS OR RELATIVES?: MORE THAN THREE TIMES A WEEK

## 2024-04-13 SDOH — SOCIAL STABILITY: SOCIAL NETWORK: HOW OFTEN DO YOU ATTENT MEETINGS OF THE CLUB OR ORGANIZATION YOU BELONG TO?: NEVER

## 2024-04-13 SDOH — SOCIAL STABILITY: SOCIAL INSECURITY: WERE YOU ABLE TO COMPLETE ALL THE BEHAVIORAL HEALTH SCREENINGS?: YES

## 2024-04-13 SDOH — HEALTH STABILITY: MENTAL HEALTH: HOW MANY STANDARD DRINKS CONTAINING ALCOHOL DO YOU HAVE ON A TYPICAL DAY?: PATIENT DOES NOT DRINK

## 2024-04-13 SDOH — SOCIAL STABILITY: SOCIAL INSECURITY: DO YOU FEEL ANYONE HAS EXPLOITED OR TAKEN ADVANTAGE OF YOU FINANCIALLY OR OF YOUR PERSONAL PROPERTY?: NO

## 2024-04-13 SDOH — ECONOMIC STABILITY: INCOME INSECURITY: IN THE PAST 12 MONTHS, HAS THE ELECTRIC, GAS, OIL, OR WATER COMPANY THREATENED TO SHUT OFF SERVICE IN YOUR HOME?: NO

## 2024-04-13 SDOH — SOCIAL STABILITY: SOCIAL NETWORK: HOW OFTEN DO YOU ATTEND MEETINGS OF THE CLUBS OR ORGANIZATIONS YOU BELONG TO?: NEVER

## 2024-04-13 SDOH — HEALTH STABILITY: PHYSICAL HEALTH: ON AVERAGE, HOW MANY DAYS PER WEEK DO YOU ENGAGE IN MODERATE TO STRENUOUS EXERCISE (LIKE A BRISK WALK)?: 0 DAYS

## 2024-04-13 SDOH — SOCIAL STABILITY: SOCIAL INSECURITY: ARE YOU MARRIED, WIDOWED, DIVORCED, SEPARATED, NEVER MARRIED, OR LIVING WITH A PARTNER?: NEVER MARRIED

## 2024-04-13 SDOH — ECONOMIC STABILITY: FOOD INSECURITY: WITHIN THE PAST 12 MONTHS, YOU WORRIED THAT YOUR FOOD WOULD RUN OUT BEFORE YOU GOT THE MONEY TO BUY MORE.: NEVER TRUE

## 2024-04-13 SDOH — SOCIAL STABILITY: SOCIAL INSECURITY: DOES ANYONE TRY TO KEEP YOU FROM HAVING/CONTACTING OTHER FRIENDS OR DOING THINGS OUTSIDE YOUR HOME?: NO

## 2024-04-13 SDOH — SOCIAL STABILITY: SOCIAL INSECURITY: DO YOU FEEL UNSAFE GOING BACK TO THE PLACE WHERE YOU ARE LIVING?: NO

## 2024-04-13 SDOH — SOCIAL STABILITY: SOCIAL INSECURITY: HAS ANYONE EVER THREATENED TO HURT YOUR FAMILY OR YOUR PETS?: NO

## 2024-04-13 ASSESSMENT — COGNITIVE AND FUNCTIONAL STATUS - GENERAL
STANDING UP FROM CHAIR USING ARMS: A LITTLE
TOILETING: A LITTLE
TOILETING: A LITTLE
WALKING IN HOSPITAL ROOM: A LITTLE
WALKING IN HOSPITAL ROOM: A LITTLE
DRESSING REGULAR LOWER BODY CLOTHING: A LOT
MOBILITY SCORE: 16
MOBILITY SCORE: 20
CLIMB 3 TO 5 STEPS WITH RAILING: A LOT
MOVING TO AND FROM BED TO CHAIR: A LITTLE
MOVING FROM LYING ON BACK TO SITTING ON SIDE OF FLAT BED WITH BEDRAILS: A LOT
DRESSING REGULAR UPPER BODY CLOTHING: A LITTLE
MOVING TO AND FROM BED TO CHAIR: A LITTLE
DRESSING REGULAR UPPER BODY CLOTHING: A LITTLE
HELP NEEDED FOR BATHING: A LOT
DAILY ACTIVITIY SCORE: 18
DRESSING REGULAR LOWER BODY CLOTHING: A LOT
CLIMB 3 TO 5 STEPS WITH RAILING: A LITTLE
STANDING UP FROM CHAIR USING ARMS: A LITTLE
DAILY ACTIVITIY SCORE: 18
TURNING FROM BACK TO SIDE WHILE IN FLAT BAD: A LITTLE
HELP NEEDED FOR BATHING: A LOT

## 2024-04-13 ASSESSMENT — LIFESTYLE VARIABLES
SKIP TO QUESTIONS 9-10: 1
HOW MANY STANDARD DRINKS CONTAINING ALCOHOL DO YOU HAVE ON A TYPICAL DAY: PATIENT DOES NOT DRINK
AUDIT-C TOTAL SCORE: 0
PRESCIPTION_ABUSE_PAST_12_MONTHS: NO
HOW OFTEN DO YOU HAVE 6 OR MORE DRINKS ON ONE OCCASION: NEVER
AUDIT-C TOTAL SCORE: 0
SKIP TO QUESTIONS 9-10: 1
SUBSTANCE_ABUSE_PAST_12_MONTHS: NO
AUDIT-C TOTAL SCORE: 0
HOW OFTEN DO YOU HAVE A DRINK CONTAINING ALCOHOL: NEVER

## 2024-04-13 ASSESSMENT — PAIN SCALES - GENERAL
PAINLEVEL_OUTOF10: 0 - NO PAIN
PAINLEVEL_OUTOF10: 2
PAINLEVEL_OUTOF10: 9
PAINLEVEL_OUTOF10: 0 - NO PAIN

## 2024-04-13 ASSESSMENT — PATIENT HEALTH QUESTIONNAIRE - PHQ9
1. LITTLE INTEREST OR PLEASURE IN DOING THINGS: MORE THAN HALF THE DAYS
2. FEELING DOWN, DEPRESSED OR HOPELESS: NOT AT ALL
SUM OF ALL RESPONSES TO PHQ9 QUESTIONS 1 & 2: 2

## 2024-04-13 ASSESSMENT — PAIN - FUNCTIONAL ASSESSMENT
PAIN_FUNCTIONAL_ASSESSMENT: 0-10

## 2024-04-13 ASSESSMENT — ACTIVITIES OF DAILY LIVING (ADL)
ADL_ASSISTANCE: INDEPENDENT
LACK_OF_TRANSPORTATION: NO
ADL_ASSISTANCE: INDEPENDENT
BATHING_ASSISTANCE: MODERATE

## 2024-04-13 NOTE — PROGRESS NOTES
Trisah Jackson is a 58 y.o. female on day 1 of admission presenting with Dizziness.      Subjective   Dizziness better       Objective     Last Recorded Vitals  /60 (BP Location: Right arm, Patient Position: Lying)   Pulse 85   Temp 36.5 °C (97.7 °F) (Oral)   Resp 18   Wt 140 kg (308 lb 6.8 oz)   SpO2 99%   Intake/Output last 3 Shifts:    Intake/Output Summary (Last 24 hours) at 4/12/2024 2122  Last data filed at 4/12/2024 1700  Gross per 24 hour   Intake 200 ml   Output 2000 ml   Net -1800 ml       Admission Weight  Weight: 140 kg (308 lb 6.8 oz) (04/11/24 0802)    Daily Weight  04/11/24 : 140 kg (308 lb 6.8 oz)    Image Results  MR brain wo IV contrast  Narrative: Interpreted By:  Jose Conti,   STUDY:  MR BRAIN WO IV CONTRAST;  4/12/2024 12:12 pm      INDICATION:  Signs/Symptoms:Patient with recent CVA with dizziness.      COMPARISON:  CT head dated 04/11/2024.      ACCESSION NUMBER(S):  [id number]      ORDERING CLINICIAN:  JASON RASMUSSEN      TECHNIQUE:  Standard multiplanar multisequence MR imaging was performed through  the brain without intravenous contrast. Axial T2, FLAIR, DWI,  gradient echo T2 and sagittal and coronal T1 weighted images of brain  were acquired.  Motion artifact degrades assessment.      FINDINGS:  Parenchyma: There are small DWI hyperintense foci involving the  region of the left anterior genu of the corpus callosum and left  frontal lobe white matter, appearing relatively normalized on ADC  imaging, with corresponding T2/FLAIR hyperintensity, likely  representing subacute ischemic insults. Additionally, there is a  small focus of signal abnormality within the right periatrial white  matter (series 502, image 22) as well as small foci within the mesial  right temporal lobe and right midbrain cerebral peduncle (image 19)  with similar imaging features. Attenuated focus of DWI hyperintensity  within the right caudate head as well. No evidence of recent  hemorrhage. There  is no mass effect or midline shift. Otherwise trace  chronic small vessel ischemic disease.      CSF Spaces: The ventricles, sulci and basal cisterns are within  normal limits for age. Basilar cisterns are patent.      Extra-axial spaces: No extra-axial fluid collection.      Paranasal Sinuses: Mild ethmoidal mucosal sinus thickening. Otherwise  the visualized paranasal sinuses are clear.      Mastoids: Clear.      Orbits: Bilateral native lens extractions. Slight tortuosity of the  optic nerve sheaths bilaterally.      Calvarium: No suspicious osseous marrow signal.      Impression: Multiple small foci of diffusion restriction signal abnormality  suggestive of subacute ischemic insults involving the left frontal  lobe and anterior corpus callosal region, right periatrial white  matter, right mesial temporal lobe, and right midbrain cerebral  peduncle. Additional attenuated focus within the right caudate head.  No mass effect or hemorrhagic transformation. Otherwise trace chronic  small vessel ischemic disease.      MACRO:  None      Signed by: Jose Conti 4/12/2024 3:14 PM  Dictation workstation:   [id number]      Physical Exam/unchanged    Relevant Results               Assessment/Plan                  Principal Problem:    Dizziness    AnxietyESRD on HD              Jena Chandler MD

## 2024-04-13 NOTE — PROGRESS NOTES
Physical Therapy    Physical Therapy Evaluation    Patient Name: Trisha Jacskon  MRN: [id number]  Today's Date: 4/13/2024   Time Calculation  Start Time: 0928  Stop Time: 0944  Time Calculation (min): 16 min    Assessment/Plan   PT Assessment  PT Assessment Results: Decreased strength, Decreased endurance, Impaired balance, Decreased mobility, Obesity, Decreased skin integrity  Rehab Prognosis: Good  Evaluation/Treatment Tolerance: Patient tolerated treatment well  Medical Staff Made Aware: Yes  Strengths: Attitude of self, Ability to acquire knowledge, Premorbid level of function  End of Session Communication: Bedside nurse  Assessment Comment: Pt performs transfers and ambulation using FWW with CGA for safety. Standing balance and endurance deficits limit standing tolerance/ambulation distance.Pt may benefit from low intensity rehab upon discharge to maximize safety and IND upon return to home. Pt may benefit from ongoing skilled therapy in acute care stay to progress activity tolerance and level of IND prior to discharge.  End of Session Patient Position: Bed, 2 rail up, Alarm off, not on at start of session  IP OR SWING BED PT PLAN  Inpatient or Swing Bed: Inpatient  PT Plan  Treatment/Interventions: Bed mobility, Transfer training, Gait training, Stair training, Balance training, Neuromuscular re-education, Strengthening, Endurance training, Range of motion, Therapeutic exercise, Therapeutic activity, Home exercise program, Positioning, Postural re-education  PT Plan: Skilled PT  PT Frequency: 3 times per week  PT Discharge Recommendations: Low intensity level of continued care  Equipment Recommended upon Discharge: Wheeled walker  PT Recommended Transfer Status: Assist x1  PT - OK to Discharge: Yes      Subjective   General Visit Information:  General  Reason for Referral: Mobility impairments, PT eval and treat  Referred By: Dr. Chandler  Past Medical History Relevant to Rehab: 1. End-stage kidney disease on  "dialysis Monday Wednesday Friday  2. Recent stroke  3. Hypertension  4. Diabetes mellitus type 2  5. Anemia of chronic kidney disease  6. Hyperlipidemia  7. Obesity  8. Mild hyperkalemia  9. Mild fluid overload 10. R foot transmetatarsal amputation  Missed Visit: No  Family/Caregiver Present: No  Prior to Session Communication: Bedside nurse  Patient Position Received: Bed, 2 rail up, Alarm off, not on at start of session  Preferred Learning Style: verbal  General Comment: Pt is a 57 y/o female presenting to ED with dizziness. Pt reports she was admitted to [facility] last week and diagnosed with acute stroke. CT head upon this admission shows subacute infarcts, no acute processes. Pt reports dizziness is improving.  Home Living:  Home Living  Type of Home: Apartment  Lives With: Adult children  Home Adaptive Equipment: Walker rolling or standard, Cane  Home Layout: One level  Home Access: Stairs to enter with rails  Entrance Stairs-Rails: Left  Entrance Stairs-Number of Steps: 2  Bathroom Shower/Tub: Tub/shower unit  Home Living Comments: Pt previously had 2 handrails on entry stairs, however, one recently broke causing her to fall back and hit her head leading to a recent hospitalization. Now pt reports there is only one handrail.  Prior Level of Function:  Prior Function Per Pt/Caregiver Report  Level of [place]: Needs assistance with homemaking, Independent with ADLs and functional transfers  ADL Assistance: Independent  Homemaking Assistance: Needs assistance  Precautions:  Precautions  Hearing/Visual Limitations: Pt reports ongoing decreased vision in R eye. She describes this as \"it looks like a big black hair is hanging down in front of my eye.\"  Medical Precautions: Fall precautions  Vital Signs:       Objective   Pain:  Pain Assessment  Pain Assessment: 0-10  Pain Score: 0 - No pain  Cognition:  Cognition  Overall Cognitive Status: Within Functional Limits  Orientation Level: Oriented " X4    General Assessments:     Activity Tolerance  Endurance: Tolerates less than 10 min exercise, no significant change in vital signs    Sensation  Light Touch: No apparent deficits    Strength  Strength Comments: BUE/BLE WFL  Strength  Strength Comments: BUE/BLE WFL       Coordination  Movements are Fluid and Coordinated: Yes    Postural Control  Postural Control: Within Functional Limits    Static Sitting Balance  Static Sitting-Balance Support: No upper extremity supported, Feet supported  Static Sitting-Level of Assistance: Independent  Dynamic Sitting Balance  Dynamic Sitting-Balance Support: No upper extremity supported, Feet supported  Dynamic Sitting-Balance: Forward lean  Dynamic Sitting-Comments: IND with forward lean to reach feet    Static Standing Balance  Static Standing-Balance Support: Bilateral upper extremity supported  Static Standing-Level of Assistance: Contact guard  Static Standing-Comment/Number of Minutes: 2 minutes  Functional Assessments:     Transfers  Transfer: Yes  Transfer 1  Transfer From 1: Bed to, Stand to, Sit to  Transfer to 1: Sit, Stand  Technique 1: Stand to sit, Sit to stand  Transfer Device 1: Walker  Transfer Level of Assistance 1: Contact guard  Trials/Comments 1: CGA for safety    Ambulation/Gait Training  Ambulation/Gait Training Performed: Yes  Ambulation/Gait Training 1  Surface 1: Level tile  Device 1: Rolling walker  Assistance 1: Contact guard  Quality of Gait 1: Decreased step length, Soft knee(s), Diminished heel strike, Forward flexed posture  Comments/Distance (ft) 1: Pt amb 80' using FWW with slow gait speed, wide VINAY. Denies lightheadedness/dizziness. CGA for safety. Distance limited by fatigue.    Stairs  Stairs: No  Extremity/Trunk Assessments:  RUE   RUE : Within Functional Limits  LUE   LUE: Within Functional Limits  RLE   RLE : Within Functional Limits  LLE   LLE : Within Functional Limits  Outcome Measures:  [facility] Basic Mobility  Turning from your back  to your side while in a flat bed without using bedrails: None  Moving from lying on your back to sitting on the side of a flat bed without using bedrails: None  Moving to and from bed to chair (including a wheelchair): A little  Standing up from a chair using your arms (e.g. wheelchair or bedside chair): A little  To walk in hospital room: A little  Climbing 3-5 steps with railing: A little  Basic Mobility - Total Score: 20    Encounter Problems       Encounter Problems (Active)       PT Problem       Patient will ambulate 120' distance using walker with modified independent (Progressing)       Start:  04/13/24    Expected End:  04/27/24            Patient will ascend and descend 4-6 steps with rail modified independent (Progressing)       Start:  04/13/24    Expected End:  04/27/24            Patient will perform chair to and from bed transfer with modified independent (Progressing)       Start:  04/13/24    Expected End:  04/27/24               Pain - Adult              Education Documentation  No documentation found.  Education Comments  No comments found.

## 2024-04-13 NOTE — CARE PLAN
Problem: Pain  Goal: My pain/discomfort is manageable  4/13/2024 1028 by Lilli Mederos, RN  Outcome: Progressing  4/13/2024 1028 by Lilli Mederos, RN  Outcome: Progressing   The patient's goals for the shift include      The clinical goals for the shift include Maintain pt safety/comfort; monitor labs and vitals

## 2024-04-13 NOTE — PROGRESS NOTES
Occupational Therapy    Evaluation/Treatment    Patient Name: Trisha Jackson  MRN: [id number]  : 1965  Today's Date: 24  Time Calculation  Start Time: 920  Stop Time: 950  Time Calculation (min): 30 min       Assessment:  Prognosis: Good  Evaluation/Treatment Tolerance: Patient tolerated treatment well  Medical Staff Made Aware: Yes  End of Session Communication: Bedside nurse  End of Session Patient Position: Bed, 2 rail up, Alarm off, not on at start of session  OT Assessment Results: Decreased ADL status, Decreased endurance, Decreased functional mobility, Decreased IADLs  Prognosis: Good  Evaluation/Treatment Tolerance: Patient tolerated treatment well  Medical Staff Made Aware: Yes  Strengths: Ability to acquire knowledge, Attitude of self  Barriers to Participation: Comorbidities  Plan:  Treatment Interventions: ADL retraining, Functional transfer training, UE strengthening/ROM, Endurance training, Patient/family training, Neuromuscular reeducation  OT Frequency: 5 times per week  OT Discharge Recommendations: Low intensity level of continued care  OT Recommended Transfer Status: Assist of 1  OT - OK to Discharge: Yes  Treatment Interventions: ADL retraining, Functional transfer training, UE strengthening/ROM, Endurance training, Patient/family training, Neuromuscular reeducation    Subjective   Current Problem:  1. Dizziness        2. Weakness        3. Hallucination          General:   OT Received On: 24  General  Reason for Referral: OT Eval and treat  Past Medical History Relevant to Rehab: Pmh:diabetes mellitus type 2, hypertension, hyperlipidemia, morbid obesity,  End-stage kidney disease on dialysis   Recent stroke  Anemia of chronic kidney disease  Missed Visit: No  Family/Caregiver Present: No  Co-Treatment: PT  Co-Treatment Reason:  (optimize Pt outcomes)  Prior to Session Communication: Bedside nurse  Patient Position Received: Bed, 2 rail up  Preferred  Learning Style: verbal  General Comment:  (Pt is a 58 year old female admitted with dizziness. MRI/CT shows + multiple subacute infarcts bilaterally)  Precautions:  Medical Precautions: Fall precautions    Pain:  Pain Assessment  Pain Assessment: 0-10  Pain Score: 0 - No pain    Objective   Cognition:  Overall Cognitive Status: Within Functional Limits  Problem Solving: Within Functional Limits  Safety/Judgement: Within Functional Limits     Home Living:  Type of Home: Apartment  Lives With: Adult children  Home Adaptive Equipment: Walker rolling or standard, Cane  Home Layout: One level  Home Access: Stairs to enter with rails (2 REENA, B handrail, one railing broke recently causing Pt to fall/hospitalization)  Bathroom Shower/Tub: Tub/shower unit  Bathroom Equipment: Grab bars in shower, Shower chair with back  Prior Function:  Level of [place]: Needs assistance with homemaking, Independent with ADLs and functional transfers  ADL Assistance: Independent  Homemaking Assistance: Needs assistance  Prior Function Comments:  (prior to recent hospitalization Pt +cooking, cleaning, shopping, driving)    ADL:  Eating Assistance: Independent  Grooming Assistance: Stand by  Bathing Assistance: Moderate  UE Dressing Assistance: Minimal  LE Dressing Assistance: Moderate  Toileting Assistance with Device: Minimal  Activities of Daily Living: UE Dressing  UE Dressing Level of Assistance: Minimum assistance (to ivet gown as jacket)    LE Dressing  LE Dressing:  (Mod A to change socks, mild increased in dizziness with bending)  Activity Tolerance:  Endurance: Tolerates less than 10 min exercise, no significant change in vital signs    Bed Mobility/Transfers:    Transfers  Transfer:  (Min A progressing to SBA with FWW)    Functional Mobility:  Functional Mobility  Functional Mobility Performed:  (Min A progressing to SBA with FWW)    Standing Balance:  Static Standing Balance  Static Standing-Balance Support:  (fair)  Dynamic  Standing Balance  Dynamic Standing-Balance Support:  (fair-)    Sensation:  Light Touch: No apparent deficits  Strength:  Strength Comments:  (B UE WFL)    Coordination:  Movements are Fluid and Coordinated: Yes   Hand Function:  Hand Function  Gross Grasp: Functional  Coordination: Functional    Outcome Measures: [facility] Daily Activity  Putting on and taking off regular lower body clothing: A lot  Bathing (including washing, rinsing, drying): A lot  Putting on and taking off regular upper body clothing: A little  Toileting, which includes using toilet, bedpan or urinal: A little  Taking care of personal grooming such as brushing teeth: None  Eating Meals: None  Daily Activity - Total Score: 18        Education Documentation  ADL Training, taught by Dinorah Howard OT at 4/13/2024 11:34 AM.  Learner: Patient  Readiness: Acceptance  Method: Explanation  Response: Verbalizes Understanding    Education Comments  No comments found.        OP EDUCATION:       Goals:  Encounter Problems       Encounter Problems (Active)       ADL       Goal 1 (Progressing)       Start:  04/13/24    Expected End:  04/27/24       Patient will demonstrate improved ADL skills:  Bathing with Min assist with adaptive equipment prn     Grooming with supervision assist with good safety awareness        UE Dressing with supervision assist with good safety awareness          LE Dressing with Min assist with adaptive equipment prn        Toileting with supervision assist with good safety awareness

## 2024-04-13 NOTE — CARE PLAN
Problem: Pain  Goal: My pain/discomfort is manageable  Outcome: Progressing     Problem: Safety  Goal: Patient will be injury free during hospitalization  Outcome: Progressing  Goal: I will remain free of falls  Outcome: Progressing   The patient's goals for the shift include      The clinical goals for the shift include Maintain pt safety/comfort; monitor labs and vitals

## 2024-04-13 NOTE — CARE PLAN
Problem: Pain  Goal: My pain/discomfort is manageable  4/13/2024 1029 by Lilli Mederos RN  Outcome: Progressing  4/13/2024 1028 by Lilli Mederos RN  Outcome: Progressing  4/13/2024 1028 by Lilli Mederos, AGNIESZKA  Outcome: Progressing   The patient's goals for the shift include      The clinical goals for the shift include Maintain pt safety/comfort; monitor labs and vitals

## 2024-04-13 NOTE — PROGRESS NOTES
Trisha Jackson is a 58 y.o. female on day 2 of admission presenting with Dizziness.      Subjective   Patient seen and examined.  Patient alert and oriented and follows all commands appropriately.  States her dizziness is better.  Patient underwent dialysis yesterday.  States she is feeling significantly improved since admission.  Denies any new complaints.       Medications  Medications Prior to Admission   Medication Sig Dispense Refill Last Dose    amLODIPine (Norvasc) 5 mg tablet Take 1 tablet (5 mg) by mouth once daily.   4/10/2024    B complex-vitamin C-folic acid (Nephro-Daniel) 0.8 mg tablet Take 1 tablet by mouth once daily.   4/10/2024    gabapentin (Neurontin) 300 mg capsule Take 1 capsule (300 mg) by mouth 3 times a day for 3 days. 9 capsule 0 4/10/2024    hydrOXYzine HCL (Atarax) 10 mg tablet Take 1 tablet (10 mg) by mouth once daily.   4/10/2024    liraglutide (Victoza 2-Mark) 0.6 mg/0.1 mL (18 mg/3 mL) injection Inject 0.3 mL (1.8 mg) under the skin once daily.   4/10/2024    methocarbamol (Robaxin) 500 mg tablet Take 1 tablet (500 mg) by mouth 2 times a day.   4/10/2024    nystatin (Mycostatin) 100,000 unit/gram powder Apply 1 Application topically 3 times a day.   4/10/2024    omeprazole (PriLOSEC) 40 mg DR capsule Take 1 capsule (40 mg) by mouth once daily in the morning. Take before meals. Do not crush or chew.   4/10/2024    SITagliptin phosphate (Januvia) 25 mg tablet Take 1 tablet (25 mg) by mouth once daily.   4/10/2024    sucroferric oxyhydroxide (Velphoro) 500 mg tablet,chewable chewable tablet Chew 3 tablets (1,500 mg) 3 times a day with meals.   4/10/2024    torsemide (Demadex) 100 mg tablet Take 1 tablet (100 mg) by mouth once daily.   4/10/2024    ammonium lactate (Lac-Hydrin) 12 % lotion Apply topically if needed for dry skin.       hydrALAZINE (Apresoline) 25 mg tablet Take 1 tablet (25 mg) by mouth if needed.       insulin glargine (Lantus U-100 Insulin) 100 unit/mL injection Inject 15  "Units under the skin once daily at bedtime. Take as directed per insulin instructions.       insulin lispro (HumaLOG) 100 unit/mL injection Inject under the skin 3 times a day with meals. Take as directed per insulin instructions.       isosorbide mononitrate ER (Imdur) 30 mg 24 hr tablet Take 1 tablet (30 mg) by mouth once daily. Do not crush or chew.       [] lidocaine (Lidoderm) 5 % patch Place 1 patch over 12 hours on the skin once daily for 14 days. Remove & discard patch within 12 hours or as directed by MD. 14 patch 0     lidocaine-prilocaine (Emla) cream Apply topically if needed for mild pain (1 - 3).       tiZANidine (Zanaflex) 4 mg tablet Take 1 tablet (4 mg) by mouth every 6 hours if needed for muscle spasms for up to 10 days. 30 tablet 0        Review of Systems  14 point review of systems was reviewed and negative except as noted above.    Objective     Last Recorded Vitals  Blood pressure 123/67, pulse 83, temperature 37 °C (98.6 °F), temperature source Oral, resp. rate 18, height 1.702 m (5' 7\"), weight 140 kg (308 lb 6.8 oz), SpO2 98%.      Neurological Exam  Physical Exam  Mental Status : More alert and interactive today  Alert , O x 3  Attention and concentration normal        Speech : Clear , fluent  No aphasia or dysarthria noted     CN 2-12 :  Pupils round , regular reacting to light  Fundus could not be assessed  VA intact, EOM intact  Facial sensation intact  Mild right facial asymmetry noted  Hearing acuity intact bilaterally  Tongue midline  Shoulder shrug intact     Motor:  Strength moves all extremities against gravity  Normal bulk and tone     Sensory:  Intact to light touch     Reflexes : 1+  symmetric with equivocal toes     Coordination : Intact to Finger to Nose     Gait : Unable to assess.    Relevant Results  MR brain wo IV contrast    Result Date: 2024  Interpreted By:  Jose Conti, STUDY: MR BRAIN WO IV CONTRAST;  2024 12:12 pm   INDICATION: " Signs/Symptoms:Patient with recent CVA with dizziness.   COMPARISON: CT head dated 04/11/2024.   ACCESSION NUMBER(S): [id number]   ORDERING CLINICIAN: JASON RASMUSSEN   TECHNIQUE: Standard multiplanar multisequence MR imaging was performed through the brain without intravenous contrast. Axial T2, FLAIR, DWI, gradient echo T2 and sagittal and coronal T1 weighted images of brain were acquired.  Motion artifact degrades assessment.   FINDINGS: Parenchyma: There are small DWI hyperintense foci involving the region of the left anterior genu of the corpus callosum and left frontal lobe white matter, appearing relatively normalized on ADC imaging, with corresponding T2/FLAIR hyperintensity, likely representing subacute ischemic insults. Additionally, there is a small focus of signal abnormality within the right periatrial white matter (series 502, image 22) as well as small foci within the mesial right temporal lobe and right midbrain cerebral peduncle (image 19) with similar imaging features. Attenuated focus of DWI hyperintensity within the right caudate head as well. No evidence of recent hemorrhage. There is no mass effect or midline shift. Otherwise trace chronic small vessel ischemic disease.   CSF Spaces: The ventricles, sulci and basal cisterns are within normal limits for age. Basilar cisterns are patent.   Extra-axial spaces: No extra-axial fluid collection.   Paranasal Sinuses: Mild ethmoidal mucosal sinus thickening. Otherwise the visualized paranasal sinuses are clear.   Mastoids: Clear.   Orbits: Bilateral native lens extractions. Slight tortuosity of the optic nerve sheaths bilaterally.   Calvarium: No suspicious osseous marrow signal.       Multiple small foci of diffusion restriction signal abnormality suggestive of subacute ischemic insults involving the left frontal lobe and anterior corpus callosal region, right periatrial white matter, right mesial temporal lobe, and right midbrain cerebral  peduncle. Additional attenuated focus within the right caudate head. No mass effect or hemorrhagic transformation. Otherwise trace chronic small vessel ischemic disease.   MACRO: None   Signed by: Jose Conti 4/12/2024 3:14 PM Dictation workstation:   [id number]     Results for orders placed or performed during the hospital encounter of 04/11/24 (from the past 24 hour(s))   POCT GLUCOSE   Result Value Ref Range    POCT Glucose 124 (H) 74 - 99 mg/dL   POCT GLUCOSE   Result Value Ref Range    POCT Glucose 159 (H) 74 - 99 mg/dL   POCT GLUCOSE   Result Value Ref Range    POCT Glucose 135 (H) 74 - 99 mg/dL   POCT GLUCOSE   Result Value Ref Range    POCT Glucose 192 (H) 74 - 99 mg/dL   CBC   Result Value Ref Range    WBC 6.8 4.4 - 11.3 x10*3/uL    nRBC 0.0 0.0 - 0.0 /100 WBCs    RBC 3.62 (L) 4.00 - 5.20 x10*6/uL    Hemoglobin 10.8 (L) 12.0 - 16.0 g/dL    Hematocrit 31.5 (L) 36.0 - 46.0 %    MCV 87 80 - 100 fL    MCH 29.8 26.0 - 34.0 pg    MCHC 34.3 32.0 - 36.0 g/dL    RDW 14.3 11.5 - 14.5 %    Platelets 184 150 - 450 x10*3/uL   Renal Function Panel   Result Value Ref Range    Glucose 216 (H) 65 - 99 mg/dL    Sodium 131 (L) 133 - 145 mmol/L    Potassium 5.2 (H) 3.4 - 5.1 mmol/L    Chloride 94 (L) 97 - 107 mmol/L    Bicarbonate 21 (L) 24 - 31 mmol/L    Urea Nitrogen 52 (H) 8 - 25 mg/dL    Creatinine 8.20 (H) 0.40 - 1.60 mg/dL    eGFR 5 (L) >60 mL/min/1.73m*2    Calcium 8.8 8.5 - 10.4 mg/dL    Phosphorus 4.0 2.5 - 4.5 mg/dL    Albumin 3.6 3.5 - 5.0 g/dL    Anion Gap 16 <=19 mmol/L         Assessment/Plan      Principal Problem:    Dizziness    Patient is a 58-year-old female with known history of hypertension, diabetes, chronic kidney disease on hemodialysis was admitted for CVA this week and discharged from an outside facility on aspirin and unable to take statin due to allergy, admitted with dizziness upon awakening with lightheadedness, rule out orthostatic hypotension contributing to her symptoms and with repeat MRI  consistent with multiple diffusion-weighted defects noted and will also need to rule out sepsis, metabolic and other causes.        Recommendations;  Reviewed CT scan brain and MRI of the brain results and discussed with the patient  Reviewed her recent stroke workup including previous records/echo and imaging films from her recent discharge  Unable to compare the previous MRI reports given that there are no available MRI films uploaded from her previous study at an outside facility to further report that it has been scanned into the chart.  Continue DAPT therapy with Aspirin / Plavix X 21 days and then asa 81 mg alone  Patient unable to take statin due to allergies  Cardiology consultation  Nephrology on board  PT/OT eval and treatment  Continue dialysis as per nephrology  Orthostatic blood pressure checks  Neurochecks  Blood pressure / Blood sugar monitoring and control  Sepsis workup  Telemetry monitoring   Continue current management  Discussed the above plan with the patient   We will sign off for now.       I spent 30 minutes in the professional and overall care of this patient.      Reagan Murray MD

## 2024-04-14 LAB
ALBUMIN SERPL-MCNC: 3.5 G/DL (ref 3.5–5)
ANION GAP SERPL CALC-SCNC: 16 MMOL/L
BUN SERPL-MCNC: 60 MG/DL (ref 8–25)
CALCIUM SERPL-MCNC: 8.8 MG/DL (ref 8.5–10.4)
CHLORIDE SERPL-SCNC: 97 MMOL/L (ref 97–107)
CO2 SERPL-SCNC: 22 MMOL/L (ref 24–31)
CREAT SERPL-MCNC: 9.4 MG/DL (ref 0.4–1.6)
EGFRCR SERPLBLD CKD-EPI 2021: 4 ML/MIN/1.73M*2
ERYTHROCYTE [DISTWIDTH] IN BLOOD BY AUTOMATED COUNT: 14.6 % (ref 11.5–14.5)
GLUCOSE BLD MANUAL STRIP-MCNC: 166 MG/DL (ref 74–99)
GLUCOSE BLD MANUAL STRIP-MCNC: 171 MG/DL (ref 74–99)
GLUCOSE BLD MANUAL STRIP-MCNC: 172 MG/DL (ref 74–99)
GLUCOSE BLD MANUAL STRIP-MCNC: 186 MG/DL (ref 74–99)
GLUCOSE SERPL-MCNC: 173 MG/DL (ref 65–99)
HCT VFR BLD AUTO: 29.7 % (ref 36–46)
HGB BLD-MCNC: 9.7 G/DL (ref 12–16)
HOLD SPECIMEN: NORMAL
MCH RBC QN AUTO: 29.6 PG (ref 26–34)
MCHC RBC AUTO-ENTMCNC: 32.7 G/DL (ref 32–36)
MCV RBC AUTO: 91 FL (ref 80–100)
NRBC BLD-RTO: 0 /100 WBCS (ref 0–0)
PHOSPHATE SERPL-MCNC: 5.1 MG/DL (ref 2.5–4.5)
PLATELET # BLD AUTO: 186 X10*3/UL (ref 150–450)
POTASSIUM SERPL-SCNC: 5.4 MMOL/L (ref 3.4–5.1)
RBC # BLD AUTO: 3.28 X10*6/UL (ref 4–5.2)
SODIUM SERPL-SCNC: 135 MMOL/L (ref 133–145)
WBC # BLD AUTO: 6.7 X10*3/UL (ref 4.4–11.3)

## 2024-04-14 PROCEDURE — 1200000002 HC GENERAL ROOM WITH TELEMETRY DAILY

## 2024-04-14 PROCEDURE — 2500000001 HC RX 250 WO HCPCS SELF ADMINISTERED DRUGS (ALT 637 FOR MEDICARE OP): Performed by: INTERNAL MEDICINE

## 2024-04-14 PROCEDURE — 85027 COMPLETE CBC AUTOMATED: CPT | Performed by: INTERNAL MEDICINE

## 2024-04-14 PROCEDURE — 83735 ASSAY OF MAGNESIUM: CPT | Performed by: INTERNAL MEDICINE

## 2024-04-14 PROCEDURE — 82947 ASSAY GLUCOSE BLOOD QUANT: CPT

## 2024-04-14 PROCEDURE — 2500000002 HC RX 250 W HCPCS SELF ADMINISTERED DRUGS (ALT 637 FOR MEDICARE OP, ALT 636 FOR OP/ED): Performed by: INTERNAL MEDICINE

## 2024-04-14 PROCEDURE — 80069 RENAL FUNCTION PANEL: CPT | Performed by: INTERNAL MEDICINE

## 2024-04-14 RX ORDER — HEPARIN SODIUM 1000 [USP'U]/ML
1000 INJECTION, SOLUTION INTRAVENOUS; SUBCUTANEOUS
Status: CANCELLED | OUTPATIENT
Start: 2024-04-15

## 2024-04-14 RX ORDER — SODIUM POLYSTYRENE SULFONATE 15 G/60ML
30 SUSPENSION ORAL; RECTAL ONCE
Status: COMPLETED | OUTPATIENT
Start: 2024-04-14 | End: 2024-04-14

## 2024-04-14 RX ADMIN — ASCORBIC ACID, THIAMINE MONONITRATE,RIBOFLAVIN, NIACINAMIDE, PYRIDOXINE HYDROCHLORIDE, FOLIC ACID, CYANOCOBALAMIN, BIOTIN, CALCIUM PANTOTHENATE, 1 CAPSULE: 100; 1.5; 1.7; 20; 10; 1; 6000; 150000; 5 CAPSULE, LIQUID FILLED ORAL at 09:13

## 2024-04-14 RX ADMIN — HYDRALAZINE HYDROCHLORIDE 25 MG: 25 TABLET ORAL at 21:05

## 2024-04-14 RX ADMIN — SODIUM POLYSTYRENE SULFONATE 30 G: 15 SUSPENSION ORAL; RECTAL at 13:28

## 2024-04-14 RX ADMIN — ISOSORBIDE MONONITRATE 30 MG: 30 TABLET, EXTENDED RELEASE ORAL at 09:13

## 2024-04-14 RX ADMIN — SITAGLIPTIN 25 MG: 50 TABLET, FILM COATED ORAL at 09:13

## 2024-04-14 RX ADMIN — HYDRALAZINE HYDROCHLORIDE 25 MG: 25 TABLET ORAL at 09:13

## 2024-04-14 RX ADMIN — NYSTATIN 1 APPLICATION: 100000 POWDER TOPICAL at 15:00

## 2024-04-14 RX ADMIN — INSULIN LISPRO 1 UNITS: 100 INJECTION, SOLUTION INTRAVENOUS; SUBCUTANEOUS at 17:54

## 2024-04-14 RX ADMIN — PANTOPRAZOLE SODIUM 40 MG: 40 TABLET, DELAYED RELEASE ORAL at 06:14

## 2024-04-14 RX ADMIN — INSULIN GLARGINE 15 UNITS: 100 INJECTION, SOLUTION SUBCUTANEOUS at 21:05

## 2024-04-14 RX ADMIN — INSULIN LISPRO 1 UNITS: 100 INJECTION, SOLUTION INTRAVENOUS; SUBCUTANEOUS at 09:14

## 2024-04-14 RX ADMIN — AMLODIPINE BESYLATE 5 MG: 5 TABLET ORAL at 09:13

## 2024-04-14 RX ADMIN — INSULIN LISPRO 1 UNITS: 100 INJECTION, SOLUTION INTRAVENOUS; SUBCUTANEOUS at 12:15

## 2024-04-14 RX ADMIN — NYSTATIN 1 APPLICATION: 100000 POWDER TOPICAL at 09:18

## 2024-04-14 RX ADMIN — NYSTATIN 1 APPLICATION: 100000 POWDER TOPICAL at 21:05

## 2024-04-14 ASSESSMENT — COGNITIVE AND FUNCTIONAL STATUS - GENERAL
HELP NEEDED FOR BATHING: A LOT
CLIMB 3 TO 5 STEPS WITH RAILING: A LOT
STANDING UP FROM CHAIR USING ARMS: A LITTLE
TOILETING: A LITTLE
TURNING FROM BACK TO SIDE WHILE IN FLAT BAD: A LITTLE
MOBILITY SCORE: 16
DAILY ACTIVITIY SCORE: 18
DRESSING REGULAR LOWER BODY CLOTHING: A LOT
MOVING TO AND FROM BED TO CHAIR: A LITTLE
WALKING IN HOSPITAL ROOM: A LITTLE
DRESSING REGULAR UPPER BODY CLOTHING: A LITTLE
MOVING FROM LYING ON BACK TO SITTING ON SIDE OF FLAT BED WITH BEDRAILS: A LOT

## 2024-04-14 ASSESSMENT — PAIN SCALES - GENERAL
PAINLEVEL_OUTOF10: 0 - NO PAIN
PAINLEVEL_OUTOF10: 0 - NO PAIN

## 2024-04-14 NOTE — CARE PLAN
The patient's goals for the shift include      The clinical goals for the shift include Fall precaution, adequate sleep    Over the shift, the patient did not make progress toward the following goals. Barriers to progression include . Recommendations to address these barriers include .      Problem: Pain  Goal: Takes deep breaths with improved pain control throughout the shift  Outcome: Met  Goal: Turns in bed with improved pain control throughout the shift  Outcome: Met  Goal: Walks with improved pain control throughout the shift  Outcome: Met  Goal: Performs ADL's with improved pain control throughout shift  Outcome: Met  Goal: Participates in PT with improved pain control throughout the shift  Outcome: Met  Goal: Free from opioid side effects throughout the shift  Outcome: Met  Goal: Free from acute confusion related to pain meds throughout the shift  Outcome: Met

## 2024-04-14 NOTE — CARE PLAN
Problem: Pain  Goal: My pain/discomfort is manageable  Outcome: Progressing   The patient's goals for the shift include      The clinical goals for the shift include Fall precaution, adequate sleep

## 2024-04-14 NOTE — NURSING NOTE
I notified Dr Jorge Shaikh and dr Chandler of patient potassium=5.4, awaiting response. Pt has no complains of chest pain.

## 2024-04-14 NOTE — PROGRESS NOTES
Trisha Jackson is a 58 y.o. female on day 2 of admission presenting with Dizziness.      Subjective   Better wants to go home,not cleared yet by renal       Objective     Last Recorded Vitals  /76 (BP Location: Right arm, Patient Position: Sitting)   Pulse 85   Temp 37 °C (98.6 °F) (Oral)   Resp 17   Wt 140 kg (308 lb 6.8 oz)   SpO2 99%   Intake/Output last 3 Shifts:    Intake/Output Summary (Last 24 hours) at 4/13/2024 2148  Last data filed at 4/13/2024 1500  Gross per 24 hour   Intake --   Output 150 ml   Net -150 ml       Admission Weight  Weight: 140 kg (308 lb 6.8 oz) (04/11/24 0802)    Daily Weight  04/11/24 : 140 kg (308 lb 6.8 oz)    Image Results  ECG 12 Lead  Normal sinus rhythm  Otherwise normal ECG  When compared with ECG of 26-SEP-2015 16:58,  Vent. rate has decreased BY  45 BPM  Questionable change in QRS axis  Confirmed by Jack Escobedo ([id number]) on 4/13/2024 4:55:25 PM      Physical Exam/unchanged    Relevant Results               Assessment/Plan                  Principal Problem:    Dizziness    Anxiety, wants to go home.              Jena Chandler MD

## 2024-04-15 ENCOUNTER — APPOINTMENT (OUTPATIENT)
Dept: DIALYSIS | Facility: HOSPITAL | Age: 59
End: 2024-04-15
Payer: MEDICARE

## 2024-04-15 VITALS
TEMPERATURE: 97.9 F | WEIGHT: 293 LBS | HEIGHT: 67 IN | RESPIRATION RATE: 18 BRPM | OXYGEN SATURATION: 100 % | SYSTOLIC BLOOD PRESSURE: 141 MMHG | BODY MASS INDEX: 45.99 KG/M2 | HEART RATE: 110 BPM | DIASTOLIC BLOOD PRESSURE: 75 MMHG

## 2024-04-15 LAB
BACTERIA BLD CULT: NORMAL
GLUCOSE BLD MANUAL STRIP-MCNC: 106 MG/DL (ref 74–99)
GLUCOSE BLD MANUAL STRIP-MCNC: 176 MG/DL (ref 74–99)
MAGNESIUM SERPL-MCNC: 1.9 MG/DL (ref 1.6–3.1)

## 2024-04-15 PROCEDURE — 6350000001 HC RX 635 EPOETIN >10,000 UNITS: Performed by: INTERNAL MEDICINE

## 2024-04-15 PROCEDURE — 2500000001 HC RX 250 WO HCPCS SELF ADMINISTERED DRUGS (ALT 637 FOR MEDICARE OP): Performed by: INTERNAL MEDICINE

## 2024-04-15 PROCEDURE — 8010000001 HC DIALYSIS - HEMODIALYSIS PER DAY

## 2024-04-15 PROCEDURE — 82947 ASSAY GLUCOSE BLOOD QUANT: CPT

## 2024-04-15 PROCEDURE — 99221 1ST HOSP IP/OBS SF/LOW 40: CPT | Performed by: INTERNAL MEDICINE

## 2024-04-15 RX ADMIN — NYSTATIN 1 APPLICATION: 100000 POWDER TOPICAL at 14:52

## 2024-04-15 RX ADMIN — PANTOPRAZOLE SODIUM 40 MG: 40 TABLET, DELAYED RELEASE ORAL at 06:04

## 2024-04-15 RX ADMIN — ACETAMINOPHEN 650 MG: 325 TABLET ORAL at 14:30

## 2024-04-15 RX ADMIN — ISOSORBIDE MONONITRATE 30 MG: 30 TABLET, EXTENDED RELEASE ORAL at 14:31

## 2024-04-15 RX ADMIN — EPOETIN ALFA-EPBX 5000 UNITS: 20000 INJECTION, SOLUTION INTRAVENOUS; SUBCUTANEOUS at 15:34

## 2024-04-15 RX ADMIN — ACETAMINOPHEN 650 MG: 325 TABLET ORAL at 01:29

## 2024-04-15 RX ADMIN — INSULIN LISPRO 1 UNITS: 100 INJECTION, SOLUTION INTRAVENOUS; SUBCUTANEOUS at 07:53

## 2024-04-15 RX ADMIN — ASCORBIC ACID, THIAMINE MONONITRATE,RIBOFLAVIN, NIACINAMIDE, PYRIDOXINE HYDROCHLORIDE, FOLIC ACID, CYANOCOBALAMIN, BIOTIN, CALCIUM PANTOTHENATE, 1 CAPSULE: 100; 1.5; 1.7; 20; 10; 1; 6000; 150000; 5 CAPSULE, LIQUID FILLED ORAL at 14:30

## 2024-04-15 RX ADMIN — AMLODIPINE BESYLATE 5 MG: 5 TABLET ORAL at 14:30

## 2024-04-15 ASSESSMENT — PAIN SCALES - GENERAL
PAINLEVEL_OUTOF10: 0 - NO PAIN
PAINLEVEL_OUTOF10: 0 - NO PAIN
PAINLEVEL_OUTOF10: 8
PAINLEVEL_OUTOF10: 0 - NO PAIN
PAINLEVEL_OUTOF10: 3

## 2024-04-15 ASSESSMENT — PAIN DESCRIPTION - DESCRIPTORS: DESCRIPTORS: ACHING

## 2024-04-15 ASSESSMENT — COGNITIVE AND FUNCTIONAL STATUS - GENERAL
TOILETING: A LITTLE
DRESSING REGULAR LOWER BODY CLOTHING: A LOT
MOVING TO AND FROM BED TO CHAIR: A LITTLE
TURNING FROM BACK TO SIDE WHILE IN FLAT BAD: A LITTLE
DRESSING REGULAR UPPER BODY CLOTHING: A LITTLE
HELP NEEDED FOR BATHING: A LOT
WALKING IN HOSPITAL ROOM: A LITTLE
DAILY ACTIVITIY SCORE: 18
STANDING UP FROM CHAIR USING ARMS: A LITTLE
MOVING FROM LYING ON BACK TO SITTING ON SIDE OF FLAT BED WITH BEDRAILS: A LOT

## 2024-04-15 ASSESSMENT — PAIN - FUNCTIONAL ASSESSMENT
PAIN_FUNCTIONAL_ASSESSMENT: 0-10
PAIN_FUNCTIONAL_ASSESSMENT: 0-10
PAIN_FUNCTIONAL_ASSESSMENT: FLACC (FACE, LEGS, ACTIVITY, CRY, CONSOLABILITY)

## 2024-04-15 ASSESSMENT — PAIN DESCRIPTION - LOCATION: LOCATION: ARM

## 2024-04-15 ASSESSMENT — PAIN DESCRIPTION - ORIENTATION
ORIENTATION: RIGHT;LEFT
ORIENTATION: OTHER (COMMENT)

## 2024-04-15 NOTE — PROGRESS NOTES
Trisha Jackson is a 58 y.o. female on day 3 of admission presenting with Dizziness.      Subjective   Wants to go home       Objective     Last Recorded Vitals  /79 (BP Location: Right arm, Patient Position: Lying)   Pulse 81   Temp 36.9 °C (98.4 °F) (Oral)   Resp 19   Wt 140 kg (308 lb 6.8 oz)   SpO2 100%   Intake/Output last 3 Shifts:  No intake or output data in the 24 hours ending 04/14/24 2128    Admission Weight  Weight: 140 kg (308 lb 6.8 oz) (04/11/24 0802)    Daily Weight  04/11/24 : 140 kg (308 lb 6.8 oz)    Image Results  ECG 12 Lead  Normal sinus rhythm  Otherwise normal ECG  When compared with ECG of 26-SEP-2015 16:58,  Vent. rate has decreased BY  45 BPM  Questionable change in QRS axis  Confirmed by Jack Escobedo ([id number]) on 4/13/2024 4:55:25 PM      Physical Exam    Relevant Results               Assessment/Plan                  Principal Problem:    Dizziness    Hyperkalemia, needs dyalisis rosa Chandler MD

## 2024-04-15 NOTE — NURSING NOTE
"Spoke with Dr. Farris over secure chat. Orthostatic Bps obtained and notified Dr. Farris.  He states the patient is \"good to go.\"  Discharge preparation.  "

## 2024-04-15 NOTE — PROGRESS NOTES
04/15/24 1051   Discharge Planning   Patient expects to be discharged to: Home     Declining home going needs.

## 2024-04-15 NOTE — CARE PLAN
The patient's goals for the shift include      The clinical goals for the shift include Adequate sleep    Over the shift, the patient did not make progress toward the following goals. Barriers to progression include . Recommendations to address these barriers include .      Problem: Pain  Goal: My pain/discomfort is manageable  Outcome: Progressing     Problem: Safety  Goal: Patient will be injury free during hospitalization  Outcome: Progressing  Goal: I will remain free of falls  Outcome: Progressing     Problem: Daily Care  Goal: Daily care needs are met  Outcome: Progressing     Problem: Psychosocial Needs  Goal: Demonstrates ability to cope with hospitalization/illness  Outcome: Progressing  Goal: Collaborate with me, my family, and caregiver to identify my specific goals  Outcome: Progressing     Problem: Discharge Barriers  Goal: My discharge needs are met  Outcome: Progressing     Problem: Skin  Goal: Decreased wound size/increased tissue granulation at next dressing change  Outcome: Progressing  Goal: Participates in plan/prevention/treatment measures  Outcome: Progressing  Goal: Prevent/manage excess moisture  Outcome: Progressing  Goal: Prevent/minimize sheer/friction injuries  Outcome: Progressing  Goal: Promote/optimize nutrition  Outcome: Progressing  Goal: Promote skin healing  Outcome: Progressing     Problem: Pain - Adult  Goal: Verbalizes/displays adequate comfort level or baseline comfort level  4/15/2024 0600 by Jimbo Benoit RN  Outcome: Progressing  4/15/2024 0131 by Jimbo Benoit RN  Flowsheets (Taken 4/15/2024 0131)  Verbalizes/displays adequate comfort level or baseline comfort level:   Encourage patient to monitor pain and request assistance   Administer analgesics based on type and severity of pain and evaluate response   Assess pain using appropriate pain scale   Implement non-pharmacological measures as appropriate and evaluate response   Notify Licensed Independent Practitioner if  interventions unsuccessful or patient reports new pain     Problem: Safety - Adult  Goal: Free from fall injury  4/15/2024 0600 by Jimbo Benoit RN  Outcome: Progressing  4/15/2024 0131 by Jimbo Benoit RN  Flowsheets (Taken 4/15/2024 0131)  Free from fall injury:   Instruct family/caregiver on patient safety   Based on caregiver fall risk screen, instruct family/caregiver to ask for assistance with transferring infant if caregiver noted to have fall risk factors     Problem: Discharge Planning  Goal: Discharge to home or other facility with appropriate resources  4/15/2024 0600 by Jimbo Benoit RN  Outcome: Progressing  4/15/2024 0131 by Jimbo Benoit RN  Flowsheets (Taken 4/15/2024 0131)  Discharge to home or other facility with appropriate resources:   Identify barriers to discharge with patient and caregiver   Identify discharge learning needs (meds, wound care, etc)   Refer to discharge planning if patient needs post-hospital services based on physician order or complex needs related to functional status, cognitive ability or social support system   Arrange for needed discharge resources and transportation as appropriate   Arrange for interpreters to assist at discharge as needed     Problem: Chronic Conditions and Co-morbidities  Goal: Patient's chronic conditions and co-morbidity symptoms are monitored and maintained or improved  4/15/2024 0600 by Jimbo Benoit RN  Outcome: Progressing  4/15/2024 0131 by Jimbo Benoit RN  Flowsheets (Taken 4/15/2024 0131)  Care Plan - Patient's Chronic Conditions and Co-Morbidity Symptoms are Monitored and Maintained or Improved:   Monitor and assess patient's chronic conditions and comorbid symptoms for stability, deterioration, or improvement   Collaborate with multidisciplinary team to address chronic and comorbid conditions and prevent exacerbation or deterioration   Update acute care plan with appropriate goals if chronic or comorbid symptoms are exacerbated and prevent overall  improvement and discharge

## 2024-04-15 NOTE — PROGRESS NOTES
Trisha Jackson is a 58 y.o. female on day 4 of admission presenting with Dizziness.      Subjective   Had cp last night, no cp today, awaitting cardiology clearence, wants to go home       Objective     Last Recorded Vitals  /77 (BP Location: Right arm, Patient Position: Lying)   Pulse 85   Temp 36.7 °C (98 °F)   Resp 18   Wt 140 kg (308 lb 6.8 oz)   SpO2 100%   Intake/Output last 3 Shifts:  No intake or output data in the 24 hours ending 04/15/24 1000    Admission Weight  Weight: 140 kg (308 lb 6.8 oz) (04/11/24 0802)    Daily Weight  04/11/24 : 140 kg (308 lb 6.8 oz)    Image Results  ECG 12 Lead  Normal sinus rhythm  Otherwise normal ECG  When compared with ECG of 26-SEP-2015 16:58,  Vent. rate has decreased BY  45 BPM  Questionable change in QRS axis  Confirmed by Jack Escobedo ([id number]) on 4/13/2024 4:55:25 PM      Physical Exam/on dyalisis    Relevant Results               Assessment/Plan                  Principal Problem:    Dizziness    Hx cp, no cp today              Jena Chandler MD

## 2024-04-15 NOTE — PROGRESS NOTES
Trisha Jackson is a 58 y.o. female on day 3 of admission presenting with Dizziness.      Subjective   Wants to go home, k 5.4       Objective     Last Recorded Vitals  /79 (BP Location: Right arm, Patient Position: Lying)   Pulse 81   Temp 36.9 °C (98.4 °F) (Oral)   Resp 19   Wt 140 kg (308 lb 6.8 oz)   SpO2 100%   Intake/Output last 3 Shifts:  No intake or output data in the 24 hours ending 04/14/24 2016    Admission Weight  Weight: 140 kg (308 lb 6.8 oz) (04/11/24 0802)    Daily Weight  04/11/24 : 140 kg (308 lb 6.8 oz)    Image Results  ECG 12 Lead  Normal sinus rhythm  Otherwise normal ECG  When compared with ECG of 26-SEP-2015 16:58,  Vent. rate has decreased BY  45 BPM  Questionable change in QRS axis  Confirmed by Jack Escobedo ([id number]) on 4/13/2024 4:55:25 PM      Physical Exam    Relevant Results               Assessment/Plan                  Principal Problem:    Dizziness                  Jena Chandler MD

## 2024-04-15 NOTE — PRE-PROCEDURE NOTE
Report from Sending RN:    Report From: darline gole rn   Recent Surgery of Procedure: No  Baseline Level of Consciousness (LOC): x4  Oxygen Use: No  Type: na  Diabetic: Yes  Last BP Med Given Day of Dialysis: see emar  Last Pain Med Given: see emar   Lab Tests to be Obtained with Dialysis: No  Blood Transfusion to be Given During Dialysis: No  Available IV Access: Yes  Medications to be Administered During Dialysis: No  Continuous IV Infusion Running: No  Restraints on Currently or in the Last 24 Hours: N/A  Hand-Off Communication: stable for hd no issues  Dialysis Catheter Dressing: clean dry intact  Last Dressing Change: 4/8/24 to be changed today after hd

## 2024-04-15 NOTE — NURSING NOTE
Assumed patient care. BSSR received from previous Nurse. Seen patient lying on bed awake, no distress or discomfort noted. Safety measures ensured and call light in reach.  Plan of care ongoing.     Patient complaining of chest discomfort.      1935H:  EKG done, vital signs taken and recorded.  Informed Dr. Chandler through secured chat.  Waiting for orders.    2012H:  Called Dr. Chandler orders made and carried out.    2016H:  Called Dr. Sheth, Cardiology in consult, but no answer.  Message sent through secured chat.  Waiting for reply.    2024H:  Called answering service for Dr. Sheth and left a message.    2105H: Patient is in stable condition, NSR on telemetry.  Will continue to monitor.    04/15/2024  0605H:  Patient verbalized not having any chest discomfort.

## 2024-04-15 NOTE — CONSULTS
Consults  History Of Present Illness:    Trisha Jackson is a 58 y.o. female patient with a history of Hypertension hyperlipidemia history of obesity as well as on hemodialysis.  Patient was recently admitted to [facility].  History of CVA in the past about a week ago.  Patient was discharged and came back to [facility] emergency room with a feeling dizziness lightness.  Particular when she was sitting.  No syncopal episode.  Symptoms got better.  Lasted for few minutes.  Felt also weakness in the right side more than left side of the body.  No speech impediment.  MRI showed multiple small stroke.  Consulted for rule out possible cardiovascular etiology of dizziness lightheadedness.  Other history significant for questionable dark stool.  Or anemia.  Patient also history of diabetes.  Currently on insulin.  Ongoing orthostatic blood pressure.  Patient currently on aspirin Plavix for 21 days after that aspirin alone.  Patient had electrocardiogram essentially showed sinus rhythm with old septal infarct with a nonspecific ST lesion seen.  Last Recorded Vitals:  Vitals:    24 1937 24 2300 04/15/24 0700 04/15/24 0825   BP: 152/79 147/71 151/77    BP Location: Right arm Right arm Right arm    Patient Position: Lying Lying Lying    Pulse: 81 91 89 85   Resp: 19 20 18    Temp: 36.9 °C (98.4 °F) 36.7 °C (98.1 °F) 36.6 °C (97.9 °F) 36.7 °C (98 °F)   TempSrc: Oral Oral Oral    SpO2: 100% 100% 100%    Weight:       Height:           Past Medical History:  She has a past medical history of Personal history of other diseases of the nervous system and sense organs (2014).    Past Surgical History:  She has a past surgical history that includes Total vaginal hysterectomy (2013);  section, classic (2013); Hysterectomy (2013); and Total knee arthroplasty (2015).      Social History:  She reports that she has never smoked. She has never been exposed to tobacco smoke. She has never  used smokeless tobacco. No history on file for alcohol use and drug use.    Family History:  No family history on file.     Allergies:  Egg and Statins-hmg-coa reductase inhibitors    Inpatient Medications:  Scheduled medications   Medication Dose Route Frequency    amLODIPine  5 mg oral Daily    B complex-vitamin C-folic acid  1 capsule oral Daily    epoetin raf or biosimilar  5,000 Units subcutaneous Once per day on Monday Wednesday Friday    hydrALAZINE  25 mg oral BID    insulin glargine  15 Units subcutaneous Nightly    insulin lispro  0-5 Units subcutaneous TID with meals    isosorbide mononitrate ER  30 mg oral Daily    nystatin  1 Application Topical TID    pantoprazole  40 mg oral Daily before breakfast    SITagliptin phosphate  25 mg oral Daily     Outpatient Medications:  Current Outpatient Medications   Medication Instructions    amLODIPine (NORVASC) 5 mg, oral, Daily    ammonium lactate (Lac-Hydrin) 12 % lotion Topical, As needed    B complex-vitamin C-folic acid (Nephro-Daniel) 0.8 mg tablet 0.8 mg, oral, Daily    gabapentin (NEURONTIN) 300 mg, oral, 3 times daily    hydrALAZINE (APRESOLINE) 25 mg, oral, As needed    hydrOXYzine HCL (ATARAX) 10 mg, oral, Daily    insulin lispro (HumaLOG) 100 unit/mL injection subcutaneous, 3 times daily with meals, Take as directed per insulin instructions.    isosorbide mononitrate ER (IMDUR) 30 mg, oral, Daily, Do not crush or chew.    Lantus U-100 Insulin 15 Units, subcutaneous, Nightly, Take as directed per insulin instructions.    lidocaine-prilocaine (Emla) cream Topical, As needed    methocarbamol (ROBAXIN) 500 mg, oral, 2 times daily    nystatin (Mycostatin) 100,000 unit/gram powder 1 Application, Topical, 3 times daily    omeprazole (PRILOSEC) 40 mg, oral, Daily before breakfast, Do not crush or chew.    SITagliptin phosphate (JANUVIA) 25 mg, oral, Daily    tiZANidine (ZANAFLEX) 4 mg, oral, Every 6 hours PRN    torsemide (DEMADEX) 100 mg, oral, Daily     "Velphoro 1,500 mg, oral, 3 times daily with meals    Victoza 2-Mark 1.8 mg, subcutaneous, Daily       Physical Exam:  HEENT: Normocephalic/atraumatic pupils equal react light  Neck exam mild JVD, no bruit  Lung exam clear to auscultation, few crackles at the bases  Cardiac exam is regular rhythm S1-S2, soft systolic murmur heard.   Abdomen soft nontender, nondistended  Extremities no clubbing, cyanosis but trace edema  Neuro exam grossly intact.  Last Labs:  CBC - 4/14/2024:  6:18 AM  6.7 9.7 186    29.7      CMP - 4/14/2024:  6:18 AM  8.8 6.9 19 --- <0.2   5.1 3.5 20 171      PTT - No results in last year.  1.0   11.0 _     No results found for: \"TROPHS\", \"BNP\", \"LDLCALC\", \"VLDL\"       ECG 12 Lead  Normal sinus rhythm  Otherwise normal ECG  When compared with ECG of 26-SEP-2015 16:58,  Vent. rate has decreased BY  45 BPM  Questionable change in QRS axis  Confirmed by Jack Escobedo ([id number]) on 4/13/2024 4:55:25 PM      Principal Problem:    Dizziness    Assessment/Plan   58-year female patient with history of diabetes, hypertension, hyperlipidemia morbid obesity now with a history of multiple CVA in the past.  Patient now with episode of dizziness lightheadedness with some weakness and right-sided.  Patient has seen by neurologist.  Ongoing workup.  Currently on aspirin Plavix for 21 days followed by aspirin alone.  1.  Dizziness: Check orthostatic blood pressure.  May be more likely due to neurologically.  Continue current amlodipine, hydralazine, Imdur.  Check 2D echocardiogram from [facility].  At the moment supportive therapy.  No aggressive cardiac workup.  May need some physical therapy.  2.  Hypertension: Continue current amlodipine, hydralazine, Imdur.  Check orthostatic blood pressure.  Critical care time is spent at bedside includes review of diagnostic tests, labs, and radiographs, serial assessments and management of hemodynamics, EKGs, old echoes, cardiac work-up and coordination of " care.  Assessment, impression and plans are reflected in the note above as well as the orders.    Code Status:  Full Code  I spent 60 minutes in the professional and overall care of this patient.  Jerome Farris MD

## 2024-04-15 NOTE — PROGRESS NOTES
Physical Therapy                 Therapy Communication Note    Patient Name: Trisha Jackson  MRN: [id number]  Today's Date: 4/15/2024     Discipline: Physical Therapy    Missed Visit Reason: Missed Visit Reason: Patient in a medical procedure (Patient off nursing cuevas receiving dialysis.)    Missed Time: Attempt

## 2024-04-15 NOTE — PROGRESS NOTES
Trisha Jackson is a 58 y.o. female on day 4 of admission presenting with Dizziness.      Subjective   Patient was seen on dialysis and tolerating well with no complaints other than mild edema of her lower extremities.       Objective          Vitals 24HR  Heart Rate:  [81-91]   Temp:  [36.6 °C (97.9 °F)-36.9 °C (98.4 °F)]   Resp:  [18-20]   BP: (135-152)/(69-79)   SpO2:  [98 %-100 %]       Intake/Output last 3 Shifts:  No intake or output data in the 24 hours ending 04/15/24 1059    Physical Exam  Constitutional:       General: She is awake. She is not in acute distress.  Neck:      Comments: Right IJ tunneled dialysis catheter present  Cardiovascular:      Rate and Rhythm: Regular rhythm.      Heart sounds:      No friction rub.   Pulmonary:      Effort: Pulmonary effort is normal.      Comments: Mildly diminished breath sounds  Abdominal:      General: Bowel sounds are normal.      Palpations: Abdomen is soft.      Tenderness: There is no guarding or rebound.   Musculoskeletal:      Right lower leg: Edema present.      Left lower leg: Edema present.   Neurological:      Mental Status: She is alert.         Relevant Results  Results for orders placed or performed during the hospital encounter of 04/11/24 (from the past 24 hour(s))   POCT GLUCOSE   Result Value Ref Range    POCT Glucose 186 (H) 74 - 99 mg/dL   POCT GLUCOSE   Result Value Ref Range    POCT Glucose 166 (H) 74 - 99 mg/dL   POCT GLUCOSE   Result Value Ref Range    POCT Glucose 172 (H) 74 - 99 mg/dL   POCT GLUCOSE   Result Value Ref Range    POCT Glucose 176 (H) 74 - 99 mg/dL            Assessment/Plan   Impression:  End-stage kidney disease on dialysis Monday Wednesday Friday  Recent stroke  Hypertension  Diabetes mellitus type 2  Anemia of chronic kidney disease  Hyperlipidemia  Obesity  Mild hyperkalemia  Mild fluid overload     Recommendations:  Seen on dialysis today, continue Monday Wednesday Friday schedule  Erythropoietin for her anemia  Renal  jackelyn Shaikh MD

## 2024-04-17 LAB — BACTERIA UR CULT: ABNORMAL

## 2024-04-21 NOTE — DOCUMENTATION CLARIFICATION NOTE
"    PATIENT:               JEANIE TROTTER  ACCT #:                  [id number]  MRN:                       [id number]  :                       1965  ADMIT DATE:       2024 7:55 AM  DISCH DATE:        4/15/2024 4:48 PM  RESPONDING PROVIDER #:        [id number]          PROVIDER RESPONSE TEXT:    Sequela of recent Stroke ruled in for this admission    CDI QUERY TEXT:    Clarification    Instruction:    Based on your assessment of the patient and the clinical information, please provide the requested documentation by clicking on the appropriate radio button and enter any additional information if prompted.    Question: Please further clarify the diagnosis of Stroke as    When answering this query, please exercise your independent professional judgment. The fact that a question is being asked, does not imply that any particular answer is desired or expected.    The patient's clinical indicators include:  Clinical Information:  This is 58 y.o. female admitted for dizziness.  Pt recently discharged a week ago for Acute Stroke that was treated at [facility].    Clinical Indicators:    :  MRI brain-Multiple small foci of diffusion restriction signal abnormality suggestive of subacute ischemic insults involving the left frontal lobe and anterior corpus callosal region, right periatrial white matter, right mesial temporal lobe, and right midbrain cerebral  peduncle.     ED:  \"Stroke recently.  She states that she recently had a stroke and her left leg has been weak since then.\"     H&P:  \" ... female presenting with stroke less that one week.\"  Principle Problem:  \"Recent stroke relapse vs orthostatic hypotension, check mri brai, consult neurology, check orthostatics\"     PN:  \"Pt just had a stroke and was discharged from [facility] on \"     Consult:  \" ... was released from [facility] last Tuesday after she had suffered a stroke she was stable and she was discharged home she came into the " "emergency room here at [facility] because of generalized weakness not feeling well with some weakness more on the right side of her body comparing to the left side also complaining of dizziness but she had no speech abnormalities no headaches no blurred vision MRI of the brain did show multiple small strokes no cerebral edema.\"    4/13 PN:  \"Pt reports she was admitted to [facility] last week and diagnosed with acute stroke.\"    4/15 Cardiology PN:  \"Dizziness:  ... May be more likely due to neurologically.\"    Treatment:  MRI, Neurology consult, Cardiology consult    Risk Factors: Recent stroke, ESRD, HTN, DM  Options provided:  -- Acute Stroke ruled in for this admission  -- Sequela of recent Stroke ruled in for this admission  -- Other - I will add my own diagnosis  -- Refer to Clinical Documentation Reviewer    Query created by: Jack Rizvi on 4/18/2024 1:39 PM      Electronically signed by:  KOURTNEY FARLEY MD 4/20/2024 10:15 PM          "

## 2024-04-22 NOTE — DISCHARGE SUMMARY
Discharge Diagnosis  Dizziness    Issues Requiring Follow-Up  dizzy    Discharge Meds     Your medication list        CONTINUE taking these medications        Instructions Last Dose Given Next Dose Due   amLODIPine 5 mg tablet  Commonly known as: Norvasc           ammonium lactate 12 % lotion  Commonly known as: Lac-Hydrin           hydrALAZINE 25 mg tablet  Commonly known as: Apresoline           hydrOXYzine HCL 10 mg tablet  Commonly known as: Atarax           insulin lispro 100 unit/mL injection  Commonly known as: HumaLOG           isosorbide mononitrate ER 30 mg 24 hr tablet  Commonly known as: Imdur           Lantus U-100 Insulin 100 unit/mL injection  Generic drug: insulin glargine           nystatin 100,000 unit/gram powder  Commonly known as: Mycostatin           omeprazole 40 mg DR capsule  Commonly known as: PriLOSEC           SITagliptin phosphate 25 mg tablet  Commonly known as: Januvia                  STOP taking these medications      B complex-vitamin C-folic acid 0.8 mg tablet  Commonly known as: Nephro-Daniel        gabapentin 300 mg capsule  Commonly known as: Neurontin        lidocaine 5 % patch  Commonly known as: Lidoderm        lidocaine-prilocaine cream  Commonly known as: Emla        methocarbamol 500 mg tablet  Commonly known as: Robaxin        tiZANidine 4 mg tablet  Commonly known as: Zanaflex        torsemide 100 mg tablet  Commonly known as: Demadex        Velphoro 500 mg tablet,chewable chewable tablet  Generic drug: sucroferric oxyhydroxide        Victoza 2-Mark 0.6 mg/0.1 mL (18 mg/3 mL) injection  Generic drug: liraglutide                 Test Results Pending At Discharge  Pending Labs       No current pending labs.            Hospital Course   Better after hospital stay    Pertinent Physical Exam At Time of Discharge  Physical Exam/unchanged    Outpatient Follow-Up  Future Appointments   Date Time Provider Department Center   5/16/2024  8:00 AM [facility] CAI CT 1 [id number] [facility]    5/16/2024  8:30 AM [facility] ECHO 1 [id number] [facility]   5/23/2024  3:20 PM Cassandra James MD [id number] Academic   6/6/2024  9:20 AM Bharti Yun, APRN-CNP [id number] Academic         Jena Chandler MD

## 2024-04-25 ENCOUNTER — APPOINTMENT (OUTPATIENT)
Dept: CARDIOLOGY | Facility: HOSPITAL | Age: 59
End: 2024-04-25
Payer: COMMERCIAL

## 2024-05-01 ENCOUNTER — APPOINTMENT (OUTPATIENT)
Dept: RADIOLOGY | Facility: HOSPITAL | Age: 59
End: 2024-05-01
Payer: MEDICARE

## 2024-05-16 ENCOUNTER — HOSPITAL ENCOUNTER (OUTPATIENT)
Dept: CARDIOLOGY | Facility: HOSPITAL | Age: 59
Discharge: HOME | End: 2024-05-16
Payer: COMMERCIAL

## 2024-05-16 ENCOUNTER — HOSPITAL ENCOUNTER (OUTPATIENT)
Dept: RADIOLOGY | Facility: HOSPITAL | Age: 59
Discharge: HOME | End: 2024-05-16
Payer: COMMERCIAL

## 2024-05-16 DIAGNOSIS — Z01.818 PRE-TRANSPLANT EVALUATION FOR KIDNEY TRANSPLANT: ICD-10-CM

## 2024-05-16 DIAGNOSIS — Z01.818 PRE-TRANSPLANT EVALUATION FOR KIDNEY TRANSPLANT: Primary | ICD-10-CM

## 2024-05-16 LAB
AORTIC VALVE PEAK VELOCITY: 1.18 M/S
AV PEAK GRADIENT: 5.6 MMHG
AVA (PEAK VEL): 4.51 CM2
EJECTION FRACTION APICAL 4 CHAMBER: 48.9
LEFT ATRIUM VOLUME AREA LENGTH INDEX BSA: 26 ML/M2
LEFT VENTRICLE INTERNAL DIMENSION DIASTOLE: 4.47 CM (ref 3.5–6)
LEFT VENTRICULAR OUTFLOW TRACT DIAMETER: 2.3 CM
LV EJECTION FRACTION BIPLANE: 55 %
MITRAL VALVE E/A RATIO: 0.96
MITRAL VALVE E/E' RATIO: 14.02
RIGHT VENTRICLE FREE WALL PEAK S': 17.2 CM/S
TRICUSPID ANNULAR PLANE SYSTOLIC EXCURSION: 2.7 CM

## 2024-05-16 PROCEDURE — 93010 ELECTROCARDIOGRAM REPORT: CPT | Performed by: STUDENT IN AN ORGANIZED HEALTH CARE EDUCATION/TRAINING PROGRAM

## 2024-05-16 PROCEDURE — 93306 TTE W/DOPPLER COMPLETE: CPT

## 2024-05-16 PROCEDURE — 2500000004 HC RX 250 GENERAL PHARMACY W/ HCPCS (ALT 636 FOR OP/ED): Performed by: SURGERY

## 2024-05-16 PROCEDURE — 93005 ELECTROCARDIOGRAM TRACING: CPT

## 2024-05-16 PROCEDURE — 93306 TTE W/DOPPLER COMPLETE: CPT | Performed by: STUDENT IN AN ORGANIZED HEALTH CARE EDUCATION/TRAINING PROGRAM

## 2024-05-16 PROCEDURE — 75571 CT HRT W/O DYE W/CA TEST: CPT

## 2024-05-16 RX ADMIN — PERFLUTREN 2 ML OF DILUTION: 6.52 INJECTION, SUSPENSION INTRAVENOUS at 10:14

## 2024-05-17 LAB
ATRIAL RATE: 74 BPM
P AXIS: 64 DEGREES
P OFFSET: 196 MS
P ONSET: 131 MS
PR INTERVAL: 170 MS
Q ONSET: 216 MS
QRS COUNT: 12 BEATS
QRS DURATION: 96 MS
QT INTERVAL: 448 MS
QTC CALCULATION(BAZETT): 497 MS
QTC FREDERICIA: 480 MS
R AXIS: 59 DEGREES
T AXIS: 55 DEGREES
T OFFSET: 440 MS
VENTRICULAR RATE: 74 BPM

## 2024-05-23 ENCOUNTER — APPOINTMENT (OUTPATIENT)
Dept: CARDIOLOGY | Facility: HOSPITAL | Age: 59
End: 2024-05-23
Payer: COMMERCIAL

## 2024-06-06 ENCOUNTER — APPOINTMENT (OUTPATIENT)
Dept: PULMONOLOGY | Facility: HOSPITAL | Age: 59
End: 2024-06-06
Payer: COMMERCIAL

## 2024-06-11 ENCOUNTER — DOCUMENTATION (OUTPATIENT)
Dept: TRANSPLANT | Facility: HOSPITAL | Age: 59
End: 2024-06-11
Payer: COMMERCIAL

## 2024-06-12 ENCOUNTER — TELEPHONE (OUTPATIENT)
Dept: TRANSPLANT | Facility: HOSPITAL | Age: 59
End: 2024-06-12
Payer: COMMERCIAL

## 2024-06-12 ENCOUNTER — COMMITTEE REVIEW (OUTPATIENT)
Dept: TRANSPLANT | Facility: HOSPITAL | Age: 59
End: 2024-06-12
Payer: MEDICARE

## 2024-06-12 NOTE — LETTER
06/12/24    Trisha Jackson  [address]  [place] [address]    RE:  Evaluation for Transplantation     Dear Trisha,     In compliance with the regulations of the [employer] Organ Sharing (UNOS), this letter is to inform you of the [facility] Kidney Transplant Service’s decision regarding your transplant candidacy.  The Transplant Patient Selection Committee met on 6/6/2024 to discuss the results of your transplant evaluation. Based on the results, we sincerely regret that we cannot offer transplantation as a treatment option for your organ failure at this time.  This determination is based on your recent stroke in April and needing weight loss surgery which is postponed due to your recent stroke. You may return for an evaluation after 6 months when you are cleared by a neurologist.     I know that this is not the outcome that you would have liked; however, we need to recommend what we feel is safest for your long-term survival and quality of life.? Please feel free to call the pre-kidney transplant office if you have any questions or concerns.? You may reach us at [phone]. Attached is a letter from the [employer] Network for Organ Sharing (UNOS).  It describes the services and information offered to patients by UNOS and the Organ Procurement and Transplantation Network.     Additionally, you are welcome to be evaluated at an alternate transplant center.? Your dialysis unit or nephrologist may assist you with this process.     Sincerely,     The Kidney and Pancreas Transplant Team              CC: Nicky Dueñas MD, Kurtis Pimentel PA-C     Dialysis Unit: [facility] RENAL CARE [facility]    Enclosure: UNOS Patient Information Letter

## 2024-06-12 NOTE — TELEPHONE ENCOUNTER
Called pt at listed number in chart-phone is disconnected. Call placed to her dialysis center [facility]-they have the same phone number that is in Epic. Will send closed eval letter to pt and [facility] center.

## 2024-06-12 NOTE — LETTER
2024    Nicky Dueñas  [address] [place] [address] Center   [facility] [id number]    RE: Trisha Jackson  : 1965    Dear Dr. Nicky Dueñas:     Our multi-disciplinary transplant team completed a review of your patient, Trisha Jackson, on 2024.  I regret to inform you that the decision was not to proceed with placing Trisha on the [employer] for Organ Sharing (UNOS) waiting list for a Kidney transplant.    Our transplant program consists of surgeons and medical doctors who provide coverage 365 days a year, 24 hours a day.      Please don't hesitate to contact us at Dept: [phone] with any questions or concerns.       Sincerely,      Zulma Mae RN            The OS Toll-free Patient Services Line:  Your Resource for Organ Transplant Information    If you have a question regarding your own medical care, you always should call your transplant hospital first. However, for general organ transplant-related information, you should call the [employer] Network for Organ Sharing (UNOS) toll-free patient services line at [phone].  Anyone, including potential transplant candidates, candidates, recipients, family members, friends, living donors, and donor family members, can call this number to:    Talk about organ donation, living donation, the transplant process, the donation process, and transplant policies.  Get a free patient information kit with helpful booklets, waiting list and transplant information, and a list of all transplant hospitals.  Ask questions about the Organ Procurement and Transplantation Network (OPTN) web site (http://optn.transplant.hrsa.gov/), the UNOS Web site (http://unos.org/), or the UNOS web site for living donors and transplant recipients. (http://www.transplantliving.org/).  Learn how UNOS and the OPTN can help you.  Talk about any concerns that you may have with a transplant hospital.    [facility] is a not-for-profit  organization that provides the administrative services for the national OPTN under federal contract to the Health Resources and Services Administration (HRSA), an agency under the U.S. Department of Health and Human Services (HHS).    OS and the OPTN are responsible for:    Providing educational material for patients, the public, and professionals.  Raising awareness of the need for donated organs and tissue.  Writing organ transplant policy with help from transplant professionals, transplant patients, transplant candidates, donor families, living donors, and the public.  Coordinating organ procurement, matching, and placement.  Collecting information about every organ transplant and donation that occurs in the [place].    Remember, you should contact your transplant hospital directly if you have questions or concerns about your own medical care including medical records, work-up progress, and test results.    [facility] is not your transplant hospital, and staff at [facility] will not be able to transfer you to your transplant hospital, so keep your transplant hospital’s phone number handy.    However, while you research your transplant needs and learn as much as you can about transplantation and donation, we welcome your call to our toll-free patient services line at [phone].      [facility] PIL Final Rev 1-

## 2024-06-12 NOTE — COMMITTEE REVIEW
"Presentation for Listing Evaluation Date: 2/13/2024  Committee Review Date: 6/6/2024    Organ being evaluated for: Kidney    Transplant Phase: Evaluation  Transplant Status: Active    Referring Physician: Nicky Dueñas    Committee Review Decision: Declined      Committee Discussion Details:   The candidate's evaluation was presented and discussed at the Kidney  Multidisciplinary Selection Conference. Pt recently had been  hospitalized for a stroke in April, and was in evaluation for weight loss surgery. Per Dr. Prado \"Unfortunately because of very recent stroke her bariatric surgery should be postpone for 6 months approximately.\" After review of the candidate's diagnosis and the evaluations of the multidisciplinary team members, it was the consensus of the Selection Committee that the candidate does not meet Kidney Selection Criteria and is Declined for Kidney transplant. Pt may return for evaluation in 6 months after clearance by neurology and after she has been cleared for weight loss surgery.   "

## 2024-06-12 NOTE — LETTER
June 12, 2024    Trisha Jackson  [address]  [place] [address]      Dear Ms. Jackson:    Our multi-disciplinary transplant team completed a review of your medical records on 6/6/2024.  I regret to inform you that the decision was not to proceed with placing you on the [employer] Network for Organ Sharing (UNOS) waiting list for a Kidney transplant.    Our transplant program consists of surgeons and medical doctors who provide coverage 365 days a year, 24 hours a day.     If you have any questions or concerns regarding your insurance coverage or billing issues, a  is available to speak with you.     It is important to keep us updated of any major changes in your medical condition, contact information and health insurance coverage.     Please don't hesitate to contact us at Dept: [phone] with any questions or concerns. We look forward to working with you through this process.      Sincerely,      Zulma Mae RN          The UNOS Toll-free Patient Services Line:  Your Resource for Organ Transplant Information    If you have a question regarding your own medical care, you always should call your transplant hospital first. However, for general organ transplant-related information, you should call the [employer] Network for Organ Sharing (UNOS) toll-free patient services line at [phone].  Anyone, including potential transplant candidates, candidates, recipients, family members, friends, living donors, and donor family members, can call this number to:    Talk about organ donation, living donation, the transplant process, the donation process, and transplant policies.  Get a free patient information kit with helpful booklets, waiting list and transplant information, and a list of all transplant hospitals.  Ask questions about the Organ Procurement and Transplantation Network (OPTN) web site (http://optn.transplant.hrsa.gov/), the UNOS Web site (http://unos.org/), or the UNOS  web site for living donors and transplant recipients. (http://www.transplantliving.org/).  Learn how [facility] and the OPTN can help you.  Talk about any concerns that you may have with a transplant hospital.    [employer] is a not-for-profit organization that provides the administrative services for the national OPTN under federal contract to the Health Resources and Services Administration (HRSA), an agency under the U.S. Department of Health and Human Services (HHS).    [facility] and the OPTN are responsible for:    Providing educational material for patients, the public, and professionals.  Raising awareness of the need for donated organs and tissue.  Writing organ transplant policy with help from transplant professionals, transplant patients, transplant candidates, donor families, living donors, and the public.  Coordinating organ procurement, matching, and placement.  Collecting information about every organ transplant and donation that occurs in the [place].    Remember, you should contact your transplant hospital directly if you have questions or concerns about your own medical care including medical records, work-up progress, and test results.    [facility] is not your transplant hospital, and staff at [facility] will not be able to transfer you to your transplant hospital, so keep your transplant hospital’s phone number handy.    However, while you research your transplant needs and learn as much as you can about transplantation and donation, we welcome your call to our toll-free patient services line at [phone].      [facility] PIL Final Rev 1-

## 2024-07-17 ENCOUNTER — DOCUMENTATION (OUTPATIENT)
Dept: TRANSPLANT | Facility: HOSPITAL | Age: 59
End: 2024-07-17
Payer: COMMERCIAL

## 2024-07-17 NOTE — PROGRESS NOTES
Rec'd call from patient today wanting to know if she has missed any appointments because she wants to stay on the transplant list. She stated she had a stroke recently and things have been hectic. I told her I would have the nurse call her back. Note sent to [facility].

## 2024-07-18 ENCOUNTER — APPOINTMENT (OUTPATIENT)
Dept: CARDIOLOGY | Facility: HOSPITAL | Age: 59
End: 2024-07-18
Payer: COMMERCIAL

## 2024-07-18 ENCOUNTER — TELEPHONE (OUTPATIENT)
Dept: TRANSPLANT | Facility: HOSPITAL | Age: 59
End: 2024-07-18
Payer: COMMERCIAL

## 2024-07-18 NOTE — TELEPHONE ENCOUNTER
Spoke to pt via phone and explained to her that her evaluation is closed, and discussed the reasons for closing and what she would need to do in order to come back in for evaluation. All questions and concerns addressed. Pt thanked this writer for the information.

## 2024-08-01 ENCOUNTER — APPOINTMENT (OUTPATIENT)
Dept: CARDIOLOGY | Facility: HOSPITAL | Age: 59
End: 2024-08-01
Payer: COMMERCIAL

## 2024-08-01 ENCOUNTER — APPOINTMENT (OUTPATIENT)
Dept: PULMONOLOGY | Facility: HOSPITAL | Age: 59
End: 2024-08-01
Payer: COMMERCIAL

## 2024-08-12 NOTE — POST-PROCEDURE NOTE
"Goal Outcome Evaluation:      Plan of Care Reviewed With: patient    Overall Patient Progress: improvingOverall Patient Progress: improving    Outcome Evaluation: HFNC during day. IS given. Nebs administered.    ICU End of Shift Summary.  For vital signs and complete assessments, please see documentation flowsheets.      Pertinent assessments: A&Ox4. Anxious at times. Denies pain. Afebrile. SR. BPs stable; scheduled metoprolol. HFNC/BiPAP. BLUE. Ostomy. Voiding without complications with bedside commode. Regular diet, little appetite. SBA x1. PIV x1.     Major Shift Events: -Weaning HFNC as able    Plan (Upcoming Events): -IS.               -Treat anxiety    Discharge/Transfer Needs: TBD     1530- Telephone report given to 6th floor nurse. No questions at this time      Problem: Adult Inpatient Plan of Care  Goal: Plan of Care Review  Description: The Plan of Care Review/Shift note should be completed every shift.  The Outcome Evaluation is a brief statement about your assessment that the patient is improving, declining, or no change.  This information will be displayed automatically on your shift  note.  Outcome: Progressing  Flowsheets (Taken 8/12/2024 1235)  Outcome Evaluation: HFNC during day. IS given. Nebs administered.  Plan of Care Reviewed With: patient  Overall Patient Progress: improving  Goal: Patient-Specific Goal (Individualized)  Description: You can add care plan individualizations to a care plan. Examples of Individualization might be:  \"Parent requests to be called daily at 9am for status\", \"I have a hard time hearing out of my right ear\", or \"Do not touch me to wake me up as it startles  me\".  Outcome: Progressing  Goal: Absence of Hospital-Acquired Illness or Injury  Outcome: Progressing  Intervention: Identify and Manage Fall Risk  Recent Flowsheet Documentation  Taken 8/12/2024 1200 by Rosy Irvin, RN  Safety Promotion/Fall Prevention:   activity supervised   assistive device/personal items " Report to Receiving RN:    Report To: darline goel rn   Time Report Called: 1250  Hand-Off Communication: stable 2 liters off no issues. Dressing changed  Complications During Treatment: No  Ultrafiltration Treatment: No  Medications Administered During Dialysis: No  Blood Products Administered During Dialysis: No  Labs Sent During Dialysis: No  Heparin Drip Rate Changes: No  Dialysis Catheter Dressing: clean dry intact, changed today  Last Dressing Change: 4/15/24    Electronic Signatures:  James Abbott RN (Signed )   Authored:    (Signed )   Authored:     Last Updated: 12:54 PM by JAMES ABBOTT          within reach   clutter free environment maintained   increased rounding and observation   increase visualization of patient   lighting adjusted   nonskid shoes/slippers when out of bed   patient and family education   room organization consistent   safety round/check completed   supervised activity   treat reversible contributory factors   treat underlying cause  Taken 8/12/2024 0845 by Rosy Irvin RN  Safety Promotion/Fall Prevention:   activity supervised   assistive device/personal items within reach   clutter free environment maintained   increased rounding and observation   increase visualization of patient   lighting adjusted   nonskid shoes/slippers when out of bed   patient and family education   room organization consistent   safety round/check completed   supervised activity   treat reversible contributory factors   treat underlying cause  Intervention: Prevent Skin Injury  Recent Flowsheet Documentation  Taken 8/12/2024 1200 by Rosy Irvin RN  Body Position:   position changed independently   sitting up in bed  Skin Protection:   adhesive use limited   incontinence pads utilized   pouching devices used   pulse oximeter probe site changed  Device Skin Pressure Protection:   absorbent pad utilized/changed   tubing/devices free from skin contact   skin-to-skin areas padded   positioning supports utilized  Taken 8/12/2024 1000 by Rosy Irvin RN  Body Position:   position changed independently   sitting up in bed  Taken 8/12/2024 0845 by Rosy Irvin RN  Body Position:   position changed independently   sitting up in bed  Skin Protection:   adhesive use limited   incontinence pads utilized   pouching devices used   pulse oximeter probe site changed  Device Skin Pressure Protection:   absorbent pad utilized/changed   tubing/devices free from skin contact   skin-to-skin areas padded   positioning supports utilized  Intervention: Prevent and Manage VTE (Venous Thromboembolism) Risk  Recent  Flowsheet Documentation  Taken 8/12/2024 1200 by Rosy Irvin RN  VTE Prevention/Management: (eliquis) SCDs off (sequential compression devices)  Taken 8/12/2024 0845 by Rosy Irvin RN  VTE Prevention/Management: (eliquis) SCDs off (sequential compression devices)  Intervention: Prevent Infection  Recent Flowsheet Documentation  Taken 8/12/2024 1200 by Rosy Irvin RN  Infection Prevention:   rest/sleep promoted   personal protective equipment utilized   hand hygiene promoted   equipment surfaces disinfected   cohorting utilized   environmental surveillance performed   single patient room provided   visitors restricted/screened  Taken 8/12/2024 0845 by Rosy Irvin RN  Infection Prevention:   rest/sleep promoted   personal protective equipment utilized   hand hygiene promoted   equipment surfaces disinfected   cohorting utilized   environmental surveillance performed   single patient room provided   visitors restricted/screened  Goal: Optimal Comfort and Wellbeing  Outcome: Progressing  Intervention: Provide Person-Centered Care  Recent Flowsheet Documentation  Taken 8/12/2024 1200 by Rosy Irvin RN  Trust Relationship/Rapport:   care explained   choices provided   emotional support provided   questions answered   questions encouraged   reassurance provided   thoughts/feelings acknowledged  Taken 8/12/2024 0845 by Rosy Irvin RN  Trust Relationship/Rapport:   care explained   choices provided   emotional support provided   questions answered   questions encouraged   reassurance provided   thoughts/feelings acknowledged  Goal: Readiness for Transition of Care  Outcome: Progressing

## 2024-11-22 ENCOUNTER — DOCUMENTATION (OUTPATIENT)
Dept: TRANSPLANT | Facility: HOSPITAL | Age: 59
End: 2024-11-22
Payer: COMMERCIAL

## 2024-11-29 ENCOUNTER — TELEPHONE (OUTPATIENT)
Dept: TRANSPLANT | Facility: HOSPITAL | Age: 59
End: 2024-11-29
Payer: COMMERCIAL

## 2024-12-09 ENCOUNTER — TELEPHONE (OUTPATIENT)
Dept: TRANSPLANT | Facility: HOSPITAL | Age: 59
End: 2024-12-09
Payer: COMMERCIAL

## 2024-12-09 DIAGNOSIS — Z01.818 PRE-TRANSPLANT EVALUATION FOR KIDNEY TRANSPLANT: Primary | ICD-10-CM

## 2025-03-01 ENCOUNTER — APPOINTMENT (OUTPATIENT)
Dept: RADIOLOGY | Facility: HOSPITAL | Age: 60
End: 2025-03-01
Payer: COMMERCIAL

## 2025-03-01 ENCOUNTER — HOSPITAL ENCOUNTER (EMERGENCY)
Facility: HOSPITAL | Age: 60
Discharge: HOME | End: 2025-03-01
Attending: STUDENT IN AN ORGANIZED HEALTH CARE EDUCATION/TRAINING PROGRAM
Payer: COMMERCIAL

## 2025-03-01 VITALS
SYSTOLIC BLOOD PRESSURE: 104 MMHG | TEMPERATURE: 98.6 F | OXYGEN SATURATION: 99 % | DIASTOLIC BLOOD PRESSURE: 56 MMHG | HEART RATE: 82 BPM | RESPIRATION RATE: 19 BRPM

## 2025-03-01 DIAGNOSIS — S16.1XXA STRAIN OF NECK MUSCLE, INITIAL ENCOUNTER: ICD-10-CM

## 2025-03-01 DIAGNOSIS — S80.211A ABRASION OF RIGHT KNEE, INITIAL ENCOUNTER: ICD-10-CM

## 2025-03-01 DIAGNOSIS — S09.90XA CLOSED HEAD INJURY, INITIAL ENCOUNTER: Primary | ICD-10-CM

## 2025-03-01 DIAGNOSIS — S83.91XA SPRAIN OF RIGHT KNEE, UNSPECIFIED LIGAMENT, INITIAL ENCOUNTER: ICD-10-CM
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
## 2025-03-01 PROCEDURE — 73564 X-RAY EXAM KNEE 4 OR MORE: CPT | Mod: RT

## 2025-03-01 PROCEDURE — 73564 X-RAY EXAM KNEE 4 OR MORE: CPT | Mod: RIGHT SIDE | Performed by: RADIOLOGY

## 2025-03-01 PROCEDURE — 90471 IMMUNIZATION ADMIN: CPT | Performed by: CLINICAL NURSE SPECIALIST

## 2025-03-01 PROCEDURE — 90715 TDAP VACCINE 7 YRS/> IM: CPT | Performed by: CLINICAL NURSE SPECIALIST

## 2025-03-01 PROCEDURE — 2500000004 HC RX 250 GENERAL PHARMACY W/ HCPCS (ALT 636 FOR OP/ED): Performed by: CLINICAL NURSE SPECIALIST

## 2025-03-01 PROCEDURE — 70450 CT HEAD/BRAIN W/O DYE: CPT

## 2025-03-01 PROCEDURE — 72125 CT NECK SPINE W/O DYE: CPT | Performed by: RADIOLOGY

## 2025-03-01 PROCEDURE — 70450 CT HEAD/BRAIN W/O DYE: CPT | Performed by: RADIOLOGY

## 2025-03-01 PROCEDURE — 99284 EMERGENCY DEPT VISIT MOD MDM: CPT | Mod: 25 | Performed by: STUDENT IN AN ORGANIZED HEALTH CARE EDUCATION/TRAINING PROGRAM

## 2025-03-01 PROCEDURE — 76377 3D RENDER W/INTRP POSTPROCES: CPT

## 2025-03-01 PROCEDURE — 72125 CT NECK SPINE W/O DYE: CPT

## 2025-03-01 PROCEDURE — 70486 CT MAXILLOFACIAL W/O DYE: CPT

## 2025-03-01 RX ADMIN — TETANUS TOXOID, REDUCED DIPHTHERIA TOXOID AND ACELLULAR PERTUSSIS VACCINE, ADSORBED 0.5 ML: 5; 2.5; 8; 8; 2.5 SUSPENSION INTRAMUSCULAR at 08:38

## 2025-03-01 ASSESSMENT — PAIN DESCRIPTION - PROGRESSION: CLINICAL_PROGRESSION: NOT CHANGED

## 2025-03-01 ASSESSMENT — PAIN SCALES - GENERAL: PAINLEVEL_OUTOF10: 0 - NO PAIN

## 2025-03-01 ASSESSMENT — COLUMBIA-SUICIDE SEVERITY RATING SCALE - C-SSRS
2. HAVE YOU ACTUALLY HAD ANY THOUGHTS OF KILLING YOURSELF?: NO
6. HAVE YOU EVER DONE ANYTHING, STARTED TO DO ANYTHING, OR PREPARED TO DO ANYTHING TO END YOUR LIFE?: NO
1. IN THE PAST MONTH, HAVE YOU WISHED YOU WERE DEAD OR WISHED YOU COULD GO TO SLEEP AND NOT WAKE UP?: NO

## 2025-03-01 ASSESSMENT — PAIN - FUNCTIONAL ASSESSMENT: PAIN_FUNCTIONAL_ASSESSMENT: 0-10

## 2025-03-01 NOTE — ED PROVIDER NOTES
Department of Emergency Medicine   ED  Provider Note  Admit Date/RoomTime: 3/1/2025  8:22 AM  ED Room: 17/17        History of Present Illness:  Chief Complaint   Patient presents with    Fall     Fell while using her walker this am. Pt states she did not get dizzy she has a abrasion above her right eyebrow. Denies any other pain.          Trisha Jackson is a 59 y.o. female history of hypertension, diabetes, chronic kidney disease on dialysis, depression, history of stroke, hyperlipidemia, peripheral vascular disease, anemia, sickle cell trait presenting to the emergency department with complaints of fall.  Patient states she was trying to pick something off of the floor when she was using her walker and toppled forward hitting her head on the floor reports she did see black after hitting her head but no nausea no vomiting no headache.  Denies any difficulty moving her arms or legs complains of pain to the top of her right knee and has an abrasion.  She is unsure her last tetanus shot was given.  She denies any chest pain shortness of breath nausea vomiting diarrhea no lightheadedness or dizziness.  Denies any difficulty opening or shutting her eyes and no facial pain.  Reports teeth are intact.  Had dialysis yesterday.  Complains of a slight runny nose and she has been outside and a slight cough today.  No wheezing or shortness of breath no fever or chills.    Review of Systems:   Pertinent positives and negatives are stated within HPI, all other systems reviewed and are negative.        --------------------------------------------- PAST HISTORY ---------------------------------------------  Past Medical History:  has a past medical history of Personal history of other diseases of the nervous system and sense organs (2014).  Past Surgical History:  has a past surgical history that includes Total vaginal hysterectomy (2013);  section, classic (2013); Hysterectomy (2013); and Total  knee arthroplasty (01/09/2015).  Social History:  reports that she has never smoked. She has never been exposed to tobacco smoke. She has never used smokeless tobacco.  Family History: family history is not on file.. Unless otherwise noted, family history is non contributory  The patient’s home medications have been reviewed.  Allergies: Egg and Statins-hmg-coa reductase inhibitors        ---------------------------------------------------PHYSICAL EXAM--------------------------------------    GENERAL APPEARANCE: Awake and alert.   VITAL SIGNS: As per the nurses' triage record.   HEENT: Normocephalic, abrasion right forehead extraocular muscles are intact.  No raccoon eyes or schaffer signs noted.  Epistaxis noted.  No bite to the tongue or lip.  Pupils equal round and reactive to light. Conjunctiva are pink. Negative scleral icterus. Mucous membranes are moist. Tongue in the midline. Pharynx was without erythema or exudates, uvula midline  NECK: Soft and supple, full gross ROM, no meningeal signs.  Slight tenderness with movement along the sternomastoid muscle left-sided.  No midline tenderness no step-offs crepitus or bruising  CHEST: Nontender to palpation. Clear to auscultation bilaterally. No rales, rhonchi, or wheezing.  No flail chest  HEART: S1, S2. Regular rate and rhythm. No murmurs, gallops or rubs.  Strong and equal pulses in the extremities.   ABDOMEN: Soft, nontender, nondistended, positive bowel sounds, no palpable masses.  MUSCULCSKELETAL: The calves are nontender to palpation. Full gross active range of motion.  Fistula right upper extremity positive thrill and bruit radial pulse strong and intact  Normal strength of the upper extremities peripheral pulses intact.  Right lower extremity partial amputation of the foot well-healed pedal pulse intact.  Abrasion noted to the anterior patella.  No pain palpation over the patella line.  Full range of motion of the knee.  No calf redness warmth or tenderness.   Quadricep intact.  Left lower extremity moves with no difficulty.  Peripheral pulse intact   NEUROLOGICAL: Awake, alert and oriented x 3. Power intact in the upper and lower extremities. Sensation is intact to light touch in the upper and lower extremities.   IMMUNOLOGICAL: No lymphatic streaking noted   DERM: No petechiae, rashes, or ecchymoses.          ------------------------- NURSING NOTES AND VITALS REVIEWED ---------------------------  The nursing notes within the ED encounter and vital signs as below have been reviewed by myself  /56 (BP Location: Right arm, Patient Position: Lying)   Pulse 82   Temp 37 °C (98.6 °F) (Oral)   Resp 19   LMP  (LMP Unknown)   SpO2 99%     Oxygen Saturation Interpretation: 98% room air      The patient’s available past medical records and past encounters were reviewed.          -----------------------DIAGNOSTIC RESULTS------------------------  LABS:    Labs Reviewed - No data to display    As interpreted by me, the above displayed labs are abnormal. All other labs obtained during this visit were within normal range or not returned as of this dictation.        CT head wo IV contrast   Final Result   No evidence of acute cortical infarct or intracranial hemorrhage.        No evidence of intracranial hemorrhage or displaced skull fracture.        MACRO:   None        Signed by: New Odom 3/1/2025 10:18 AM   Dictation workstation:   [id number]      CT cervical spine wo IV contrast   Final Result   No evidence for an acute fracture or subluxation of the cervical   spine.        MACRO:   None        Signed by: New Odom 3/1/2025 10:41 AM   Dictation workstation:   [id number]      CT maxillofacial bones wo IV contrast   Final Result   No acute facial bone fracture visualized.        MACRO:   None        Signed by: New Odom 3/1/2025 10:39 AM   Dictation workstation:   [id number]      XR knee right 4+ views   Final Result   Marked degenerative changes. No acute bony  abnormality             MACRO:   None        Signed by: Nena Tenorio 3/1/2025 10:04 AM   Dictation workstation:   [id number]      CT 3D reconstruction    (Results Pending)           CT head wo IV contrast   Final Result   No evidence of acute cortical infarct or intracranial hemorrhage.        No evidence of intracranial hemorrhage or displaced skull fracture.        MACRO:   None        Signed by: New Odom 3/1/2025 10:18 AM   Dictation workstation:   [id number]      CT cervical spine wo IV contrast   Final Result   No evidence for an acute fracture or subluxation of the cervical   spine.        MACRO:   None        Signed by: New Odom 3/1/2025 10:41 AM   Dictation workstation:   [id number]      CT maxillofacial bones wo IV contrast   Final Result   No acute facial bone fracture visualized.        MACRO:   None        Signed by: New Odom 3/1/2025 10:39 AM   Dictation workstation:   [id number]      XR knee right 4+ views   Final Result   Marked degenerative changes. No acute bony abnormality             MACRO:   None        Signed by: Nena Tenorio 3/1/2025 10:04 AM   Dictation workstation:   [id number]      CT 3D reconstruction    (Results Pending)           ------------------------------ ED COURSE/MEDICAL DECISION MAKING----------------------  Medical Decision Making:   Exam: A medically appropriate exam performed, outlined above, given the known history and presentation.    History obtained from: Review of medical record nursing notes patient      Social Determinants of Health considered during this visit: Takes care of herself at home walks with a walker      PAST MEDICAL HISTORY/Chronic Conditions Affecting Care     has a past medical history of Personal history of other diseases of the nervous system and sense organs (09/27/2014).       CC/HPI Summary, Social Determinants of health, Records Reviewed, DDx, testing done/not done, ED Course, Reassessment, disposition considerations/shared decision making  with patient, consults, disposition:   Presents to the emergency department with complaints of abrasion to her right forehead right knee status post fall  Plan  Tdap  CT cervical No evidence for an acute fracture or subluxation of the cervical  spine.      CT head No evidence of acute cortical infarct or intracranial hemorrhage.      No evidence of intracranial hemorrhage or displaced skull fracture  CT maxillofacial No acute facial bone fracture visualized   X-ray right knee Marked degenerative changes. No acute bony abnormality   Wound care    Medical Decision Making/Differential Diagnosis:  Differentials include not limited to closed head injury versus intracranial bleed versus concussion versus fracture versus abrasion versus sprain strain  Patient presented to the emergency department complaints of fall after reaching down to  papers off of the ground.  Has not ration to the right eyebrow no suture repair needed.  No chest pain shortness of breath CT of the maxillofacial showed no acute facial bone fracture visualized.  CT of the head no evidence of acute cortical infarct or intracranial hemorrhage.  No evidence of intracranial hemorrhage or displaced skull fracture.  CT of the cervical spine showed no evidence for acute fracture or subluxation of the cervical spine.  Abrasion noted to the right knee x-ray showed degenerative changes no acute bony abnormality.  Tetanus shot was updated today wound care was performed ambulation trial performed patient tolerated well.  Based on her clinical presentation history and symptoms consistent with closed head injury  Cervical strain  Fall  Right knee abrasion  Right knee sprain  Facial abrasion  Patient seen evaluated with attending physician    [facility] for discharge utilized to discharge planning patient amenable to plan amenable to discharge she is not hypoxic tachycardic or febrile.  No signs of sepsis or toxicity  PROCEDURES  Unless otherwise noted below,  none      CONSULTS:   None      Diagnoses as of 03/01/25 1318   Closed head injury, initial encounter   Fall, initial encounter   Strain of neck muscle, initial encounter   Abrasion of right knee, initial encounter   Sprain of right knee, unspecified ligament, initial encounter         This patient has remained hemodynamically stable during their ED course.      Critical Care: None      Counseling:  The emergency provider has spoken with the patient family and discussed today’s results, in addition to providing specific details for the plan of care and counseling regarding the diagnosis and prognosis.  Questions are answered at this time and they are agreeable with the plan.         --------------------------------- IMPRESSION AND DISPOSITION ---------------------------------    IMPRESSION  1. Closed head injury, initial encounter    2. Fall, initial encounter    3. Strain of neck muscle, initial encounter    4. Abrasion of right knee, initial encounter    5. Sprain of right knee, unspecified ligament, initial encounter        DISPOSITION  Disposition: Discharge home  Patient condition is stable improved        NOTE: This report was transcribed using voice recognition software. Every effort was made to ensure accuracy; however, inadvertent computerized transcription errors may be present      SABRINA Mckeon-LUCIUS  03/01/25 131

## 2025-03-01 NOTE — ED PROVIDER NOTES
Supervisory note:  Patient seen in conjunction with Wilma Daniels NP.  Patient presents after a fall.  She states that she was bending over to get an object that had fallen onto the floor.  She then fell over her walker, hitting her head on the floor.  She reports pain in her right knee.  She normally has dialysis Monday Wednesday Friday and reports that she did go to her usual session yesterday.  She did not pass out although she reports that things turned black when she fell.  On examination, there is an abrasion of the right forehead.  There is an abrasion of the right anterior knee.  Patient can flex the right knee to just past 90 degrees and fully straighten it.  Remainder of extremities able to be moved without difficulty.  No midline spinal tenderness to palpation, however there is some pain laterally on the neck with head rotation.    Imaging studies did not reveal any acute fractures or bleeding.  Patient was able to ambulate in the emergency department as usual.  Patient discharged to follow-up with primary care physician and given return precautions for any worsening symptoms.    I personally saw the patient and made/approved the management plan and take responsiblity for the patient management.  Parts of this chart were completed with dictation software, please excuse any errors in transcription.     Kurtis Dave MD  03/01/25 [id number]

## 2025-03-01 NOTE — DISCHARGE INSTRUCTIONS
Tylenol for pain do not go the recommended daily dosage.  Keep the wounds clean and dry.  Triple antibiotic ointment twice a day.  Light stretching.  Ice 20 minutes at a time 4 times a day and after activity to painful areas.  Over the next 3 to 4 days may notice some increasing musculoskeletal pain secondary to your fall.  Close follow-up with primary care physician.  Return with any worsening symptoms or concerns

## 2025-03-12 ENCOUNTER — DOCUMENTATION (OUTPATIENT)
Dept: TRANSPLANT | Facility: HOSPITAL | Age: 60
End: 2025-03-12
Payer: MEDICARE

## 2025-03-13 ENCOUNTER — TELEPHONE (OUTPATIENT)
Facility: HOSPITAL | Age: 60
End: 2025-03-13
Payer: MEDICARE

## 2025-03-14 ENCOUNTER — DOCUMENTATION (OUTPATIENT)
Dept: TRANSPLANT | Facility: HOSPITAL | Age: 60
End: 2025-03-14
Payer: MEDICARE

## 2025-03-14 NOTE — Clinical Note
Per 03/13/25 manasa/eamon from Abigail Gilmore of [facility]  [id number] kidney txp eval & listing approved eff 03/12/25.  Eval for 1 year & listing for 5.  Precert needed at time of txp.

## 2025-03-18 ENCOUNTER — APPOINTMENT (OUTPATIENT)
Facility: HOSPITAL | Age: 60
End: 2025-03-18
Payer: COMMERCIAL

## 2025-06-10 ENCOUNTER — TELEPHONE (OUTPATIENT)
Facility: HOSPITAL | Age: 60
End: 2025-06-10

## 2025-06-17 ENCOUNTER — APPOINTMENT (OUTPATIENT)
Facility: HOSPITAL | Age: 60
End: 2025-06-17
Payer: MEDICARE